# Patient Record
Sex: FEMALE | Race: WHITE | Employment: UNEMPLOYED | URBAN - METROPOLITAN AREA
[De-identification: names, ages, dates, MRNs, and addresses within clinical notes are randomized per-mention and may not be internally consistent; named-entity substitution may affect disease eponyms.]

---

## 2020-02-18 ENCOUNTER — OFFICE VISIT (OUTPATIENT)
Dept: PULMONOLOGY | Age: 40
End: 2020-02-18

## 2020-02-18 VITALS
OXYGEN SATURATION: 100 % | HEART RATE: 62 BPM | DIASTOLIC BLOOD PRESSURE: 70 MMHG | SYSTOLIC BLOOD PRESSURE: 110 MMHG | HEIGHT: 66 IN | WEIGHT: 176 LBS | BODY MASS INDEX: 28.28 KG/M2 | RESPIRATION RATE: 20 BRPM | TEMPERATURE: 98 F

## 2020-02-18 DIAGNOSIS — J45.40 MODERATE PERSISTENT ASTHMA, UNSPECIFIED WHETHER COMPLICATED: Primary | ICD-10-CM

## 2020-02-18 DIAGNOSIS — J30.1 ALLERGIC RHINITIS DUE TO POLLEN, UNSPECIFIED SEASONALITY: ICD-10-CM

## 2020-02-18 RX ORDER — IPRATROPIUM BROMIDE AND ALBUTEROL SULFATE 2.5; .5 MG/3ML; MG/3ML
SOLUTION RESPIRATORY (INHALATION)
COMMUNITY
Start: 2020-02-08 | End: 2020-03-17 | Stop reason: SDUPTHER

## 2020-02-18 RX ORDER — MONTELUKAST SODIUM 10 MG/1
TABLET ORAL
COMMUNITY
Start: 2020-02-08 | End: 2020-03-17 | Stop reason: SDUPTHER

## 2020-02-18 RX ORDER — FLUTICASONE PROPIONATE 50 MCG
SPRAY, SUSPENSION (ML) NASAL
COMMUNITY
Start: 2020-02-08

## 2020-02-18 NOTE — PROGRESS NOTES
Verbal Order with read back per Trey Zee MD  For PFT smart panel. AMB POC PFT complete w/ bronchodilator  AMB POC PFT complete w/o bronchodilator    Dr. Donaldo Chambers MD will co-sign the orders.

## 2020-02-18 NOTE — PROGRESS NOTES
Amador Adjutant presents today for   Chief Complaint   Patient presents with    Asthma       Is someone accompanying this pt? Gosia Kwok     Is the patient using any DME equipment during OV? No     -DME Company n/a     Depression Screening:  3 most recent PHQ Screens 3/28/2014   Little interest or pleasure in doing things Nearly every day   Feeling down, depressed, irritable, or hopeless More than half the days   Total Score PHQ 2 5   Trouble falling or staying asleep, or sleeping too much Several days   Feeling tired or having little energy Nearly every day   Poor appetite, weight loss, or overeating Several days   Feeling bad about yourself - or that you are a failure or have let yourself or your family down Several days   Trouble concentrating on things such as school, work, reading, or watching TV Several days   Moving or speaking so slowly that other people could have noticed; or the opposite being so fidgety that others notice Not at all   Thoughts of being better off dead, or hurting yourself in some way Not at all   How difficult have these problems made it for you to do your work, take care of your home and get along with others Somewhat difficult       Learning Assessment:  Learning Assessment 3/28/2014   PRIMARY LEARNER Patient   HIGHEST LEVEL OF EDUCATION - PRIMARY LEARNER  GRADUATED HIGH SCHOOL OR GED   BARRIERS PRIMARY LEARNER NONE   PRIMARY LANGUAGE ENGLISH   LEARNER PREFERENCE PRIMARY OTHER (COMMENT)   ANSWERED BY patient   RELATIONSHIP SELF       Abuse Screening:  Abuse Screening Questionnaire 8/11/2014   Do you ever feel afraid of your partner? N   Are you in a relationship with someone who physically or mentally threatens you? N   Is it safe for you to go home? Y       Fall Risk  Fall Risk Assessment, last 12 mths 8/11/2014   Able to walk? Yes   Fall in past 12 months? Yes   Fall with injury? No   Number of falls in past 12 months 8 or more   Fall Risk Score 8         Coordination of Care:  1. Have you been to the ER, urgent care clinic since your last visit? Hospitalized since your last visit? No    2. Have you seen or consulted any other health care providers outside of the [address] [place] [address] since your last visit? Include any pap smears or colon screening.  No

## 2020-03-16 ENCOUNTER — TELEPHONE (OUTPATIENT)
Dept: PULMONOLOGY | Age: 40
End: 2020-03-16

## 2020-03-16 DIAGNOSIS — J45.909 ASTHMA: ICD-10-CM

## 2020-03-16 DIAGNOSIS — Z91.09 ENVIRONMENTAL ALLERGIES: ICD-10-CM

## 2020-03-16 DIAGNOSIS — J45.40 MODERATE PERSISTENT ASTHMA, UNSPECIFIED WHETHER COMPLICATED: Primary | ICD-10-CM

## 2020-03-16 NOTE — TELEPHONE ENCOUNTER
Per pt, she started with a cold, but it is now in her chest.  She is on [facility], but thinks she needs a rescue inhaler and/or albuterol sol for her nebulizer. Please call [phone].

## 2020-03-17 RX ORDER — MONTELUKAST SODIUM 10 MG/1
TABLET ORAL
Qty: 30 TAB | Refills: 3 | Status: SHIPPED | OUTPATIENT
Start: 2020-03-17

## 2020-03-17 RX ORDER — IPRATROPIUM BROMIDE AND ALBUTEROL SULFATE 2.5; .5 MG/3ML; MG/3ML
3 SOLUTION RESPIRATORY (INHALATION)
Qty: 30 NEBULE | Refills: 3 | Status: SHIPPED | OUTPATIENT
Start: 2020-03-17

## 2020-03-17 RX ORDER — ALBUTEROL SULFATE 90 UG/1
2 AEROSOL, METERED RESPIRATORY (INHALATION)
Qty: 1 INHALER | Refills: 2 | Status: SHIPPED | OUTPATIENT
Start: 2020-03-17 | End: 2020-06-01 | Stop reason: SDUPTHER

## 2020-03-17 NOTE — TELEPHONE ENCOUNTER
Pt requesting refill on Albuterol inhaler, nebulizer DuoNeb and Singular.  She had meds when she came to see Dr. Worthy Less

## 2020-06-01 ENCOUNTER — VIRTUAL VISIT (OUTPATIENT)
Dept: PULMONOLOGY | Age: 40
End: 2020-06-01

## 2020-06-01 DIAGNOSIS — J30.9 ALLERGIC RHINITIS, UNSPECIFIED SEASONALITY, UNSPECIFIED TRIGGER: Primary | ICD-10-CM

## 2020-06-01 DIAGNOSIS — J45.41 MODERATE PERSISTENT ASTHMA WITH ACUTE EXACERBATION: ICD-10-CM

## 2020-06-01 RX ORDER — ALBUTEROL SULFATE 90 UG/1
2 AEROSOL, METERED RESPIRATORY (INHALATION)
Qty: 1 INHALER | Refills: 5 | Status: SHIPPED | OUTPATIENT
Start: 2020-06-01

## 2020-06-01 RX ORDER — PREDNISONE 10 MG/1
TABLET ORAL
Qty: 18 TAB | Refills: 0 | Status: SHIPPED | OUTPATIENT
Start: 2020-06-01 | End: 2022-09-27

## 2020-06-01 RX ORDER — MOMETASONE FUROATE AND FORMOTEROL FUMARATE DIHYDRATE 200; 5 UG/1; UG/1
2 AEROSOL RESPIRATORY (INHALATION) 2 TIMES DAILY
Qty: 1 INHALER | Refills: 5 | Status: SHIPPED | OUTPATIENT
Start: 2020-06-01 | End: 2022-09-27

## 2020-06-01 NOTE — PROGRESS NOTES
Guido Buenrostro is a 45 y.o. female who was seen by synchronous (real-time) audio-video technology on 6/1/2020. Consent: Guido Buenrostro, who was seen by synchronous (real-time) audio-video technology, and/or her healthcare decision maker, is aware that this patient-initiated, Telehealth encounter on 6/1/2020 is a billable service, with coverage as determined by her insurance carrier. She is aware that she may receive a bill and has provided verbal consent to proceed: Yes. Assessment & Plan:   Diagnoses and all orders for this visit:    1. Allergic rhinitis, unspecified seasonality, unspecified trigger    2. Moderate persistent asthma with acute exacerbation  -     albuterol (PROVENTIL HFA, VENTOLIN HFA, PROAIR HFA) 90 mcg/actuation inhaler; Take 2 Puffs by inhalation every six (6) hours as needed for Wheezing or Shortness of Breath.  -     IMMUNOGLOBULIN E, QT; Future  -     CBC WITH AUTOMATED DIFF; Future    Other orders  -     mometasone-formoterol (Dulera) 200-5 mcg/actuation HFA inhaler; Take 2 Puffs by inhalation two (2) times a day. -     predniSONE (DELTASONE) 10 mg tablet; 30 mg po daily x 3 days 20 mg po daily x 3 days 10 mg po daily x3 days    pt to start Prednisone taper, see orders. Continue Dulera and rescue Albuterol, refills sent. IgE and Eosinophils ordered to assess indications for Xolair or biologics. Trigger avoidance advised. Also advised use of buffered saline nasal flushes as Flonase causes stinging. I spent at least 25 minutes on this visit with this established patient. Subjective:   Guido Buenrostro is a 45 y.o. female who was seen for Cough with sputum  pt with a history of moderate persistent Asthma who was placed on Dulera with excellent initial response. Pt has noted worsening shortness of breath with wheezing, nasal congestion and rhinorrhea with cough occasionally productive of clear phlegm. She denies fever, chills or hemoptysis.  Symptoms are similar to prior complaints associated with Asthma exacerbation and often triggered by allergies. Pt denies chest pain. Pt has been using Albuterol up to 6 times daily. She stopped using Flonase daily due to stinging of nasal mucosa. Prior to Admission medications    Medication Sig Start Date End Date Taking? Authorizing Provider   albuterol (PROVENTIL HFA, VENTOLIN HFA, PROAIR HFA) 90 mcg/actuation inhaler Take 2 Puffs by inhalation every six (6) hours as needed for Wheezing or Shortness of Breath. 6/1/20  Yes Taylor Abdul MD   mometasone-formoterol Osorio Borges) 200-5 mcg/actuation HFA inhaler Take 2 Puffs by inhalation two (2) times a day. 6/1/20  Yes Taylor Abdul MD   predniSONE (DELTASONE) 10 mg tablet 30 mg po daily x 3 days 20 mg po daily x 3 days 10 mg po daily x3 days 6/1/20  Yes Taylor Abdul MD   albuterol-ipratropium (DUO-NEB) 2.5 mg-0.5 mg/3 ml nebu 3 mL by Nebulization route every six (6) hours as needed for Wheezing or Shortness of Breath. 3/17/20  Yes Elma TELLEZ NP   montelukast (SINGULAIR) 10 mg tablet 1 Tablet daily 3/17/20  Yes Elma TELLEZ NP   fluticasone propionate (FLONASE) 50 mcg/actuation nasal spray INSTILL TWO SQUIRTS IN THE NOSTRILS D UTD 2/8/20  Yes Provider, Historical   dextroamphetamine-amphetamine (ADDERALL) 10 mg tablet Take 10 mg by mouth. Yes Provider, Historical   b complex vitamins (B COMPLEX 1) tablet Take 1 Tab by mouth daily. Yes Provider, Historical   acetaminophen (TYLENOL EXTRA STRENGTH) 500 mg tablet Take 2 Tabs by mouth every eight (8) hours as needed for Pain. 3/28/14  Yes MD carmen Altmanetasone-formoterol [facility]) 200-5 mcg/actuation HFA inhaler Take 2 Puffs by inhalation two (2) times a day.  2/18/20   Taylor Abdul MD   tiZANidine (ZANAFLEX) 4 mg tablet 1-2  qhs  prn 3/2/15   Scar Longoria MD   baclofen (LIORESAL) 10 mg tablet Take 1/2 to one tab po qhs prn muscle spasms 11/6/14   Scar Longoria MD   ondansetron hcl (ZOFRAN, AS HYDROCHLORIDE,) 4 mg tablet Take 4 mg by mouth every eight (8) hours as needed for Nausea. Provider, Historical   phenazopyridine (PYRIDIUM) 100 mg tablet Take  by mouth three (3) times daily (after meals). Provider, Historical   ondansetron (ZOFRAN ODT) 8 mg disintegrating tablet Take 1 tablet by mouth every twelve (12) hours as needed for Nausea. 10/2/14   Osmany Sanchez MD   methocarbamol (ROBAXIN) 750 mg tablet Take 1 Tab by mouth three (3) times daily as needed (muscle spasm, low back pain). 7/2/14   Osmany Sanchez MD   buPROPion [facility]) 100 mg tablet Take 1 Tab by mouth two (2) times a day. 6/16/14   Osmany Sanchez MD   ALPRAZolam Reginald Bettina) 1 mg tablet Take 1 Tab by mouth three (3) times daily as needed for Anxiety. 6/16/14   Osmany Sanchez MD   meloxicam (MOBIC) 15 mg tablet Take 1 Tab by mouth daily as needed for Pain (Take with food).  3/28/14   Osmany Sanchez MD     Allergies   Allergen Reactions    Fentanyl Hives    Pollen Extracts Itching       Past Medical History:   Diagnosis Date    Asthma     No medications, doesn't have inhaler at home; flared by spring allergies    Chronic back pain 9/20/2013    3/28/2014:  Chronic back pain, disk herniation 11 years ago, back surgery 5 years ago (doesn't want to have another back surgery) -- MRI at [facility] recently -- No relief with back surgery, ESIs used to help, but then became ineffective     Depression     Headache     Joint pain     Kidney infection     Muscle pain     Numbness and tingling     Pseudotumor cerebri     After first pregnancy, seemed to resolve but occasionally gets flares of HAs, sleep helps     Past Surgical History:   Procedure Laterality Date    CHEST SURGERY PROCEDURE UNLISTED      HX GYN      Partial hysterectomy     Family History   Problem Relation Age of Onset    Migraines Mother     Arthritis-osteo Mother     Asthma Mother     Migraines Father     Cancer Father         prostate, colon, testicular, liver, skin    Elevated Lipids Father    [place] Migraines Sister     Asthma Sister     Migraines Maternal Grandmother      Social History     Tobacco Use    Smoking status: Former Smoker     Packs/day: 0.50     Years: 10.00     Pack years: 5.00     Types: Cigarettes     Last attempt to quit: 2015     Years since quittin.4    Smokeless tobacco: Never Used    Tobacco comment: vaping stopped    Substance Use Topics    Alcohol use: Yes     Comment: 2/mo       ROS    Objective:   Vital Signs: (As obtained by patient/caregiver at home)  There were no vitals taken for this visit.      [INSTRUCTIONS:  \"[x]\" Indicates a positive item  \"[]\" Indicates a negative item  -- DELETE ALL ITEMS NOT EXAMINED]    Constitutional: [x] Appears well-developed and well-nourished [x] No apparent distress      [] Abnormal -     Mental status: [x] Alert and awake  [x] Oriented to person/place/time [x] Able to follow commands    [] Abnormal -     Eyes:   EOM    [x]  Normal    [] Abnormal -   Sclera  [x]  Normal    [] Abnormal -          Discharge [x]  None visible   [] Abnormal -     HENT: [x] Normocephalic, atraumatic  [] Abnormal -   [x] Mouth/Throat: Mucous membranes are moist    External Ears [x] Normal  [] Abnormal -    Neck: [x] No visualized mass [] Abnormal -     Pulmonary/Chest: [x] Respiratory effort normal   [x] No visualized signs of difficulty breathing or respiratory distress        [] Abnormal -      Musculoskeletal:   [x] Normal gait with no signs of ataxia         [x] Normal range of motion of neck        [] Abnormal -     Neurological:        [x] No Facial Asymmetry (Cranial nerve 7 motor function) (limited exam due to video visit)          [x] No gaze palsy        [] Abnormal -          Skin:        [x] No significant exanthematous lesions or discoloration noted on facial skin         [] Abnormal -            Psychiatric:       [x] Normal Affect [] Abnormal -        [x] No Hallucinations    Other pertinent observable physical exam findings:-        We discussed the expected course, resolution and complications of the diagnosis(es) in detail. Medication risks, benefits, costs, interactions, and alternatives were discussed as indicated. I advised her to contact the office if her condition worsens, changes or fails to improve as anticipated. She expressed understanding with the diagnosis(es) and plan. Augusta Tsai is a 45 y.o. female who was evaluated by a video visit encounter for concerns as above. Patient identification was verified prior to start of the visit. A caregiver was present when appropriate. Due to this being a TeleHealth encounter (During [facility] public [facility] emergency), evaluation of the following organ systems was limited: Vitals/Constitutional/EENT/Resp/CV/GI//MS/Neuro/Skin/Heme-Lymph-Imm. Pursuant to the emergency declaration under the [facility], 1135 waiver authority and the [employer] Act, this Virtual  Visit was conducted, with patient's (and/or legal guardian's) consent, to reduce the patient's risk of exposure to COVID-19 and provide necessary medical care. Services were provided through a video synchronous discussion virtually to substitute for in-person clinic visit. Patient and provider were located at their individual homes.       Mira Stevens MD

## 2021-11-26 ENCOUNTER — DOCUMENTATION ONLY (OUTPATIENT)
Dept: PULMONOLOGY | Age: 41
End: 2021-11-26

## 2021-11-26 NOTE — PROGRESS NOTES
Left message for pt to set up follow up appointment w/[facility]Z. Pt last seen 6/1/2020 by virtual visit. Ref in filing cabinet from  [employer].

## 2022-09-26 ENCOUNTER — OFFICE VISIT (OUTPATIENT)
Dept: SURGERY | Age: 42
End: 2022-09-26
Payer: COMMERCIAL

## 2022-09-26 VITALS
WEIGHT: 189 LBS | OXYGEN SATURATION: 99 % | BODY MASS INDEX: 31.49 KG/M2 | HEIGHT: 65 IN | SYSTOLIC BLOOD PRESSURE: 126 MMHG | HEART RATE: 79 BPM | DIASTOLIC BLOOD PRESSURE: 79 MMHG | TEMPERATURE: 98 F

## 2022-09-26 DIAGNOSIS — D17.24 LIPOMA OF LEFT LOWER EXTREMITY: Primary | ICD-10-CM

## 2022-09-26 PROCEDURE — 99204 OFFICE O/P NEW MOD 45 MIN: CPT | Performed by: SURGERY

## 2022-09-26 RX ORDER — VENLAFAXINE HYDROCHLORIDE 75 MG/1
37.5 CAPSULE, EXTENDED RELEASE ORAL 2 TIMES DAILY
COMMUNITY

## 2022-09-26 NOTE — H&P (VIEW-ONLY)
CC:   Chief Complaint   Patient presents with    New Patient     Cysts on legs         Assessment:    ICD-10-CM ICD-9-CM    1. Lipoma of left lower extremity  D17.24 214.1 SCHEDULE SURGERY          Plan: She is interested in removal of the left thigh masses which I believe are benign lipomas but due to increase size and pain she would like removed. I recommended removal under MAC and local. The risks and benefits of the procedure were reviewed with the patient including infection, bleeding, need for repeat procedure, injury to surrounding structures and poor cosmetic outcome. Questions were answered and consent was obtained. HPI:  Amber Reinoso is a 40 y.o. female who is referred for painful lumps on her legs. She states they have been there for many years and in the past she was told nothing needed to be done if they did not bother her. Overall she is feeling healthy with no other complaints. She states there are 3 lumps on her left thigh that have increased in size and do cause pain in particular when she sits down and pressure is applied to them. She does have another small bump on her right forearm and right thigh but these do not seem to cause any pain. She reports no redness or drainage from her skin. Allergies: Allergies   Allergen Reactions    Fentanyl Hives    Pollen Extracts Itching       Medication Review:  Current Outpatient Medications on File Prior to Visit   Medication Sig Dispense Refill    venlafaxine-SR (Effexor XR) 75 mg capsule Take  by mouth daily. albuterol (PROVENTIL HFA, VENTOLIN HFA, PROAIR HFA) 90 mcg/actuation inhaler Take 2 Puffs by inhalation every six (6) hours as needed for Wheezing or Shortness of Breath. 1 Inhaler 5    albuterol-ipratropium (DUO-NEB) 2.5 mg-0.5 mg/3 ml nebu 3 mL by Nebulization route every six (6) hours as needed for Wheezing or Shortness of Breath.  30 Nebule 3    montelukast (SINGULAIR) 10 mg tablet 1 Tablet daily 30 Tab 3    fluticasone propionate (FLONASE) 50 mcg/actuation nasal spray INSTILL TWO SQUIRTS IN THE NOSTRILS D UTD      dextroamphetamine-amphetamine (ADDERALL) 10 mg tablet Take 10 mg by mouth.      b complex vitamins tablet Take 1 Tab by mouth daily. buPROPion (WELLBUTRIN) 100 mg tablet Take 1 Tab by mouth two (2) times a day. 60 Tab 1    acetaminophen (TYLENOL EXTRA STRENGTH) 500 mg tablet Take 2 Tabs by mouth every eight (8) hours as needed for Pain. 99 Tab 11    mometasone-formoterol (Dulera) 200-5 mcg/actuation HFA inhaler Take 2 Puffs by inhalation two (2) times a day. (Patient not taking: Reported on 9/26/2022) 1 Inhaler 5    predniSONE (DELTASONE) 10 mg tablet 30 mg po daily x 3 days 20 mg po daily x 3 days 10 mg po daily x3 days (Patient not taking: Reported on 9/26/2022) 18 Tab 0    mometasone-formoterol (DULERA) 200-5 mcg/actuation HFA inhaler Take 2 Puffs by inhalation two (2) times a day. (Patient not taking: Reported on 9/26/2022) 1 Inhaler 0    tiZANidine (ZANAFLEX) 4 mg tablet 1-2  qhs  prn (Patient not taking: Reported on 9/26/2022) 60 Tab 1    baclofen (LIORESAL) 10 mg tablet Take 1/2 to one tab po qhs prn muscle spasms (Patient not taking: Reported on 9/26/2022) 30 tablet 1    ondansetron hcl (ZOFRAN) 4 mg tablet Take 4 mg by mouth every eight (8) hours as needed for Nausea. (Patient not taking: Reported on 9/26/2022)      phenazopyridine (PYRIDIUM) 100 mg tablet Take  by mouth three (3) times daily (after meals). (Patient not taking: Reported on 9/26/2022)      ondansetron (ZOFRAN ODT) 8 mg disintegrating tablet Take 1 tablet by mouth every twelve (12) hours as needed for Nausea. (Patient not taking: Reported on 9/26/2022) 40 tablet 0    methocarbamol (ROBAXIN) 750 mg tablet Take 1 Tab by mouth three (3) times daily as needed (muscle spasm, low back pain). (Patient not taking: Reported on 9/26/2022) 90 Tab 0    ALPRAZolam (XANAX) 1 mg tablet Take 1 Tab by mouth three (3) times daily as needed for Anxiety.  (Patient not taking: Reported on 2022) 90 Tab 1    meloxicam (MOBIC) 15 mg tablet Take 1 Tab by mouth daily as needed for Pain (Take with food). (Patient not taking: Reported on 2022) 30 Tab 1     No current facility-administered medications on file prior to visit. Systems Review:  Review of Systems   Constitutional:  Negative for fatigue and fever. Respiratory:  Negative for chest tightness and shortness of breath. Cardiovascular:  Negative for chest pain and palpitations. Skin:  Negative for pallor, rash and wound. Hematological:  Negative for adenopathy. Does not bruise/bleed easily.      PMH:  Past Medical History:   Diagnosis Date    Asthma     No medications, doesn't have inhaler at home; flared by spring allergies    Chronic back pain 2013    3/28/2014:  Chronic back pain, disk herniation 11 years ago, back surgery 5 years ago (doesn't want to have another back surgery) -- MRI at [facility] recently -- No relief with back surgery, ESIs used to help, but then became ineffective     Depression     Headache     Joint pain     Kidney infection     Muscle pain     Numbness and tingling     Pseudotumor cerebri     After first pregnancy, seemed to resolve but occasionally gets flares of HAs, sleep helps       Surgical History:  Past Surgical History:   Procedure Laterality Date    HX GYN      Partial hysterectomy    [place] CHEST SURGERY PROCEDURE UNLISTED         Social History:  Social History     Socioeconomic History    Marital status:    Tobacco Use    Smoking status: Former     Packs/day: 0.50     Years: 10.00     Pack years: 5.00     Types: Cigarettes     Quit date:      Years since quittin.7    Smokeless tobacco: Never    Tobacco comments:     vaping stopped    Substance and Sexual Activity    Alcohol use: Yes     Comment: 2/mo    Drug use: No    Sexual activity: Yes     Partners: Male     Birth control/protection: Surgical       Family History:  Family History   Problem Relation Age of Onset    Migraines Mother     OSTEOARTHRITIS Mother     Asthma Mother     Migraines Father     Cancer Father         prostate, colon, testicular, liver, skin    Elevated Lipids Father     Migraines Sister     Asthma Sister     Migraines Maternal Grandmother        No visits with results within 3 Month(s) from this visit. Latest known visit with results is:   Office Visit on 03/02/2015   Component Date Value Ref Range Status    AMPHETAMINES UR POC 03/02/2015 Negative   Final    COCAINE UR POC 03/02/2015 Negative   Final    MDMA/ECSTASY UR POC 03/02/2015 Negative   Final    METHADONE UR POC 03/02/2015 Negative   Final    METHAMPHETAMINE UR POC 03/02/2015 Negative   Final    METHYLPHENIDATE UR POC 03/02/2015 Negative   Final    OPIATES UR POC 03/02/2015 Negative   Final    OXYCODONE UR POC 03/02/2015 Negative   Final    PHENCYCLIDINE UR POC 03/02/2015 Negative   Final    TRICYCLICS UR POC 71/55/6620 Presumptive Positive   Final    BARBITURATES UR POC 03/02/2015 Negative   Final    BENZODIAZEPINES UR POC 03/02/2015 Presumptive Positive   Final    CANNABINOIDS UR POC 03/02/2015 Negative   Final       No image results found. Physical Exam:  Visit Vitals  /79 (BP 1 Location: Right arm, BP Patient Position: Sitting)   Pulse 79   Temp 98 °F (36.7 °C) (Temporal)   Ht 5' 5\" (1.651 m)   Wt 85.7 kg (189 lb)   SpO2 99%   BMI 31.45 kg/m²    BMI: Body mass index is 31.45 kg/m². Physical Exam  Constitutional:       Appearance: Normal appearance. She is normal weight. HENT:      Head: Normocephalic and atraumatic. Eyes:      Extraocular Movements: Extraocular movements intact. Conjunctiva/sclera: Conjunctivae normal.      Pupils: Pupils are equal, round, and reactive to light. Cardiovascular:      Rate and Rhythm: Normal rate. Pulmonary:      Effort: Pulmonary effort is normal.   Skin:     General: Skin is warm and dry.       Comments: Left anterior thigh mass approximately 2.0cm on exam, left upper outer thigh mass approximately 4.0cm left upper posterior thigh mass approximately 3.0cm on exam, tender to touch with no overlying skin changes   Neurological:      General: No focal deficit present. Mental Status: She is alert and oriented to person, place, and time. Mental status is at baseline. Psychiatric:         Mood and Affect: Mood normal.         Behavior: Behavior normal.         Thought Content: Thought content normal.         Judgment: Judgment normal.       I have reviewed the information entered by the clinical staff and/or patient and verified it as accurate or edited where necessary.      Electronically signed by:    Erik Bassett DO, MPH

## 2022-09-26 NOTE — PROGRESS NOTES
CC:   Chief Complaint   Patient presents with    New Patient     Cysts on legs         Assessment:    ICD-10-CM ICD-9-CM    1. Lipoma of left lower extremity  D17.24 214.1 SCHEDULE SURGERY          Plan: She is interested in removal of the left thigh masses which I believe are benign lipomas but due to increase size and pain she would like removed. I recommended removal under MAC and local. The risks and benefits of the procedure were reviewed with the patient including infection, bleeding, need for repeat procedure, injury to surrounding structures and poor cosmetic outcome. Questions were answered and consent was obtained. HPI:  Awais Brooks is a 40 y.o. female who is referred for painful lumps on her legs. She states they have been there for many years and in the past she was told nothing needed to be done if they did not bother her. Overall she is feeling healthy with no other complaints. She states there are 3 lumps on her left thigh that have increased in size and do cause pain in particular when she sits down and pressure is applied to them. She does have another small bump on her right forearm and right thigh but these do not seem to cause any pain. She reports no redness or drainage from her skin. Allergies: Allergies   Allergen Reactions    Fentanyl Hives    Pollen Extracts Itching       Medication Review:  Current Outpatient Medications on File Prior to Visit   Medication Sig Dispense Refill    venlafaxine-SR (Effexor XR) 75 mg capsule Take  by mouth daily. albuterol (PROVENTIL HFA, VENTOLIN HFA, PROAIR HFA) 90 mcg/actuation inhaler Take 2 Puffs by inhalation every six (6) hours as needed for Wheezing or Shortness of Breath. 1 Inhaler 5    albuterol-ipratropium (DUO-NEB) 2.5 mg-0.5 mg/3 ml nebu 3 mL by Nebulization route every six (6) hours as needed for Wheezing or Shortness of Breath.  30 Nebule 3    montelukast (SINGULAIR) 10 mg tablet 1 Tablet daily 30 Tab 3    fluticasone propionate (FLONASE) 50 mcg/actuation nasal spray INSTILL TWO SQUIRTS IN THE NOSTRILS D UTD      dextroamphetamine-amphetamine (ADDERALL) 10 mg tablet Take 10 mg by mouth.      b complex vitamins tablet Take 1 Tab by mouth daily. buPROPion (WELLBUTRIN) 100 mg tablet Take 1 Tab by mouth two (2) times a day. 60 Tab 1    acetaminophen (TYLENOL EXTRA STRENGTH) 500 mg tablet Take 2 Tabs by mouth every eight (8) hours as needed for Pain. 99 Tab 11    mometasone-formoterol (Dulera) 200-5 mcg/actuation HFA inhaler Take 2 Puffs by inhalation two (2) times a day. (Patient not taking: Reported on 9/26/2022) 1 Inhaler 5    predniSONE (DELTASONE) 10 mg tablet 30 mg po daily x 3 days 20 mg po daily x 3 days 10 mg po daily x3 days (Patient not taking: Reported on 9/26/2022) 18 Tab 0    mometasone-formoterol (DULERA) 200-5 mcg/actuation HFA inhaler Take 2 Puffs by inhalation two (2) times a day. (Patient not taking: Reported on 9/26/2022) 1 Inhaler 0    tiZANidine (ZANAFLEX) 4 mg tablet 1-2  qhs  prn (Patient not taking: Reported on 9/26/2022) 60 Tab 1    baclofen (LIORESAL) 10 mg tablet Take 1/2 to one tab po qhs prn muscle spasms (Patient not taking: Reported on 9/26/2022) 30 tablet 1    ondansetron hcl (ZOFRAN) 4 mg tablet Take 4 mg by mouth every eight (8) hours as needed for Nausea. (Patient not taking: Reported on 9/26/2022)      phenazopyridine (PYRIDIUM) 100 mg tablet Take  by mouth three (3) times daily (after meals). (Patient not taking: Reported on 9/26/2022)      ondansetron (ZOFRAN ODT) 8 mg disintegrating tablet Take 1 tablet by mouth every twelve (12) hours as needed for Nausea. (Patient not taking: Reported on 9/26/2022) 40 tablet 0    methocarbamol (ROBAXIN) 750 mg tablet Take 1 Tab by mouth three (3) times daily as needed (muscle spasm, low back pain). (Patient not taking: Reported on 9/26/2022) 90 Tab 0    ALPRAZolam (XANAX) 1 mg tablet Take 1 Tab by mouth three (3) times daily as needed for Anxiety.  (Patient not taking: Reported on 2022) 90 Tab 1    meloxicam (MOBIC) 15 mg tablet Take 1 Tab by mouth daily as needed for Pain (Take with food). (Patient not taking: Reported on 2022) 30 Tab 1     No current facility-administered medications on file prior to visit. Systems Review:  Review of Systems   Constitutional:  Negative for fatigue and fever. Respiratory:  Negative for chest tightness and shortness of breath. Cardiovascular:  Negative for chest pain and palpitations. Skin:  Negative for pallor, rash and wound. Hematological:  Negative for adenopathy. Does not bruise/bleed easily.      PMH:  Past Medical History:   Diagnosis Date    Asthma     No medications, doesn't have inhaler at home; flared by spring allergies    Chronic back pain 2013    3/28/2014:  Chronic back pain, disk herniation 11 years ago, back surgery 5 years ago (doesn't want to have another back surgery) -- MRI at [facility] -- No relief with back surgery, ESIs used to help, but then became ineffective     Depression     Headache     Joint pain     Kidney infection     Muscle pain     Numbness and tingling     Pseudotumor cerebri     After first pregnancy, seemed to resolve but occasionally gets flares of HAs, sleep helps       Surgical History:  Past Surgical History:   Procedure Laterality Date    HX GYN      Partial hysterectomy    CT CHEST SURGERY PROCEDURE UNLISTED         Social History:  Social History     Socioeconomic History    Marital status:    Tobacco Use    Smoking status: Former     Packs/day: 0.50     Years: 10.00     Pack years: 5.00     Types: Cigarettes     Quit date:      Years since quittin.7    Smokeless tobacco: Never    Tobacco comments:     vaping stopped    Substance and Sexual Activity    Alcohol use: Yes     Comment: 2/mo    Drug use: No    Sexual activity: Yes     Partners: Male     Birth control/protection: Surgical       Family History:  Family History   Problem Relation Age of Onset    Migraines Mother     OSTEOARTHRITIS Mother     Asthma Mother     Migraines Father     Cancer Father         prostate, colon, testicular, liver, skin    Elevated Lipids Father     Migraines Sister     Asthma Sister     Migraines Maternal Grandmother        No visits with results within 3 Month(s) from this visit. Latest known visit with results is:   Office Visit on 03/02/2015   Component Date Value Ref Range Status    AMPHETAMINES UR POC 03/02/2015 Negative   Final    COCAINE UR POC 03/02/2015 Negative   Final    MDMA/ECSTASY UR POC 03/02/2015 Negative   Final    METHADONE UR POC 03/02/2015 Negative   Final    METHAMPHETAMINE UR POC 03/02/2015 Negative   Final    METHYLPHENIDATE UR POC 03/02/2015 Negative   Final    OPIATES UR POC 03/02/2015 Negative   Final    OXYCODONE UR POC 03/02/2015 Negative   Final    PHENCYCLIDINE UR POC 03/02/2015 Negative   Final    TRICYCLICS UR POC 42/72/1618 Presumptive Positive   Final    BARBITURATES UR POC 03/02/2015 Negative   Final    BENZODIAZEPINES UR POC 03/02/2015 Presumptive Positive   Final    CANNABINOIDS UR POC 03/02/2015 Negative   Final       No image results found. Physical Exam:  Visit Vitals  /79 (BP 1 Location: Right arm, BP Patient Position: Sitting)   Pulse 79   Temp 98 °F (36.7 °C) (Temporal)   Ht 5' 5\" (1.651 m)   Wt 85.7 kg (189 lb)   SpO2 99%   BMI 31.45 kg/m²    BMI: Body mass index is 31.45 kg/m². Physical Exam  Constitutional:       Appearance: Normal appearance. She is normal weight. HENT:      Head: Normocephalic and atraumatic. Eyes:      Extraocular Movements: Extraocular movements intact. Conjunctiva/sclera: Conjunctivae normal.      Pupils: Pupils are equal, round, and reactive to light. Cardiovascular:      Rate and Rhythm: Normal rate. Pulmonary:      Effort: Pulmonary effort is normal.   Skin:     General: Skin is warm and dry.       Comments: Left anterior thigh mass approximately 2.0cm on exam, left upper outer thigh mass approximately 4.0cm left upper posterior thigh mass approximately 3.0cm on exam, tender to touch with no overlying skin changes   Neurological:      General: No focal deficit present. Mental Status: She is alert and oriented to person, place, and time. Mental status is at baseline. Psychiatric:         Mood and Affect: Mood normal.         Behavior: Behavior normal.         Thought Content: Thought content normal.         Judgment: Judgment normal.       I have reviewed the information entered by the clinical staff and/or patient and verified it as accurate or edited where necessary.      Electronically signed by:    Salma PresDO chuy, MPH

## 2022-09-26 NOTE — PROGRESS NOTES
Avni Chi is a 40 y.o. female (: 1980) presenting to address:    Chief Complaint   Patient presents with    New Patient     Cysts on legs        Medication list and allergies have been reviewed with Avni Chi and updated as of today's date. I have gone over all Medical, Surgical and Social History with Avni Chi and updated/added the information accordingly.

## 2022-09-26 NOTE — LETTER
9/26/2022    Patient: Eileen Knowles   YOB: 1980   Date of Visit: 9/26/2022     Prema Franco MD  [address]  [facility]  [address] [facility] [address]  Via Fax: [phone]    Dear Prema Franco MD,      Thank you [facility]. My notes for this consultation are attached. If you have questions, please do not hesitate to call me. I look forward to following your patient along with you.       Sincerely,    Twin Daniel, [address]

## 2022-09-26 NOTE — PATIENT INSTRUCTIONS
If you have any questions or concerns about today's appointment, the verbal and/or written instructions you were given for follow up care, please call our office at [phone]. [facility] - [address], [address] [place] [address] Road    [phone] office  [phone] fax       PATIENT PRE AND POST OPERATIVE INSTRUCTIONS       [facility]   [address] [place], [address]  [phone]    Before Surgery Instructions:   1) You must have someone available to drive you to and from your procedure and stay with you for the first 24 hours. 2) It is very important that you have nothing to eat or drink after midnight the night before your surgery. This includes chewing gum or sucking on hard candy. Take only heart, blood pressure and cholesterol medications the morning of surgery with only a sip of water. 3) Please stop taking Plavix 5-7 days prior to your surgery with prescribing physician approval.  Stop taking Coumadin 5 days prior to your surgery with prescribing physician approval.  Stop taking all Aspirin or Aspirin containing products 7 days prior to your surgery. Stop taking Advil, Motrin, Aleve, and etc. 3 days prior to your surgery. 4) If you take any diabetic medications please consult with your primary care physician on how to take them on the day of your surgery  5) Please stop all Herbal products 2 weeks prior to your surgery. 6) Please arrive at the hospital 2 hours prior to your surgery, unless you have been otherwise instructed. 7) Patients having an operation on their colon will be given a separate instruction sheet on their Bowel Prep. 8) For any pre-operative work up check in at the main entrance to [facility], and then go to Patient Registration. These studies are done on a walk in basis they are open from 7:00am to 5:00pm Monday through Friday.   9) A urine drug screen will be performed on the day of surgery. Please be advised if your drug screen test result is positive for any illegal substances your surgery is subject to cancellation. 10) Please wash your surgical site the morning of your surgery with soap and water. 11) If you are of child bearing age you will have pregnancy test done the morning of your surgery as soon as you arrive. 12) You're surgery time is subject to change. At times this is necessary due to equipment or staffing needs. Please be advised it's your responsibility to notify our office of any changes to your healthcare coverage. Failure to notify our office of any changes to your health care coverage may result in denial of payment by your health insurance for all incurred services and you would be responsible for payment for all incurred services. After Surgery Instructions: You will need to be seen in the office for a follow-up visit 7-14 days after your surgery. Please call after you have had the procedure to make this appointment. Unless otherwise instructed, you may remove your outer bandage and shower 48 hours after your surgery. If you develop a fever greater than 101, have any significant drainage, bleeding, swelling and/or pus of the wound. Please call our office immediately. Surgery Date and Time:  Thursday, September 29, 2002 at 1:30pm    Please enter [facility] main entrance on the first floor and go to Patient Registration. Once registered, a member of our team will escort you to the second floor. Please check in by 11:00am the day of your surgery. You may contact Ezra Allred with any questions at [phone].

## 2022-09-27 NOTE — PERIOP NOTES
PRE-SURGICAL INSTRUCTIONS        Patient's Name:  Steffen Lazar      [facility] Date:  9/27/2022            Covid Testing Date and Time:    Surgery Date:  9/29/2022                Do NOT eat or drink anything, including candy, gum, or ice chips after midnight on 9/29, unless you have specific instructions from your surgeon or anesthesia provider to do so. You may brush your teeth before coming to the hospital.  No smoking 24 hours prior to the day of surgery. No alcohol 24 hours prior to the day of surgery. No recreational drugs for one week prior to the day of surgery. Leave all valuables, including money/purse, at home. Remove all jewelry, nail polish, acrylic nails, and makeup (including mascara); no lotions powders, deodorant, or perfume/cologne/after shave on the skin. Follow instruction for Hibiclens washes and CHG wipes from surgeon's office. Glasses/contact lenses and dentures may be worn to the hospital.  They will be removed prior to surgery. Call your doctor if symptoms of a cold or illness develop within 24-48 hours prior to your surgery. 11.  If you are having an outpatient procedure, please make arrangements for a responsible ADULT TO [address] [place] [address] and stay with you for 24 hours after your surgery. 12. ONE VISITOR in the hospital at this time for outpatient procedures. Exceptions may be made for surgical admissions, per nursing unit guidelines      Special Instructions:      Bring list of CURRENT medications. Bring inhaler. Bring any pertinent legal medical records. Take these medications the morning of surgery with a sip of water:  per office        On the day of surgery, come in the main entrance of [facility]. Let the  at the desk know you are there for surgery. A staff member will come escort you to the surgical area on the second floor.     If you have any questions or concerns, please do not hesitate to call:     (Prior to the day of surgery) [facility] department:  [phone]   (Day of surgery) Pre-Op department:  [phone]    These surgical instructions were reviewed with the patient during the [facility] phone call.

## 2022-09-28 ENCOUNTER — ANESTHESIA EVENT (OUTPATIENT)
Dept: SURGERY | Age: 42
End: 2022-09-28
Payer: COMMERCIAL

## 2022-09-29 ENCOUNTER — ANESTHESIA (OUTPATIENT)
Dept: SURGERY | Age: 42
End: 2022-09-29
Payer: COMMERCIAL

## 2022-09-29 ENCOUNTER — HOSPITAL ENCOUNTER (OUTPATIENT)
Age: 42
Setting detail: OUTPATIENT SURGERY
Discharge: HOME | End: 2022-09-29
Attending: SURGERY | Admitting: SURGERY
Payer: COMMERCIAL

## 2022-09-29 VITALS
HEIGHT: 65 IN | RESPIRATION RATE: 12 BRPM | TEMPERATURE: 97.6 F | OXYGEN SATURATION: 92 % | WEIGHT: 188 LBS | DIASTOLIC BLOOD PRESSURE: 61 MMHG | HEART RATE: 73 BPM | SYSTOLIC BLOOD PRESSURE: 111 MMHG | BODY MASS INDEX: 31.32 KG/M2

## 2022-09-29 DIAGNOSIS — D17.20 LIPOMA OF LOWER LEG: Primary | ICD-10-CM

## 2022-09-29 DIAGNOSIS — F32.A ANXIETY AND DEPRESSION: Chronic | ICD-10-CM

## 2022-09-29 DIAGNOSIS — F41.9 ANXIETY AND DEPRESSION: Chronic | ICD-10-CM

## 2022-09-29 PROCEDURE — 74011250636 HC RX REV CODE- 250/636: Performed by: NURSE ANESTHETIST, CERTIFIED REGISTERED

## 2022-09-29 PROCEDURE — 77030018836 HC SOL IRR NACL ICUM -A: Performed by: SURGERY

## 2022-09-29 PROCEDURE — 2709999900 HC NON-CHARGEABLE SUPPLY: Performed by: SURGERY

## 2022-09-29 PROCEDURE — 76210000021 HC REC RM PH II 0.5 TO 1 HR: Performed by: SURGERY

## 2022-09-29 PROCEDURE — 77030031139 HC SUT VCRL2 J&J -A: Performed by: SURGERY

## 2022-09-29 PROCEDURE — 74011250636 HC RX REV CODE- 250/636: Performed by: SURGERY

## 2022-09-29 PROCEDURE — 00400 ANES INTEGUMENTARY SYS NOS: CPT | Performed by: ANESTHESIOLOGY

## 2022-09-29 PROCEDURE — 74011000250 HC RX REV CODE- 250: Performed by: SURGERY

## 2022-09-29 PROCEDURE — 76210000006 HC OR PH I REC 0.5 TO 1 HR: Performed by: SURGERY

## 2022-09-29 PROCEDURE — 76060000032 HC ANESTHESIA 0.5 TO 1 HR: Performed by: SURGERY

## 2022-09-29 PROCEDURE — 77030002933 HC SUT MCRYL J&J -A: Performed by: SURGERY

## 2022-09-29 PROCEDURE — 76010000138 HC OR TIME 0.5 TO 1 HR: Performed by: SURGERY

## 2022-09-29 PROCEDURE — 88304 TISSUE EXAM BY PATHOLOGIST: CPT

## 2022-09-29 PROCEDURE — 00400 ANES INTEGUMENTARY SYS NOS: CPT | Performed by: NURSE ANESTHETIST, CERTIFIED REGISTERED

## 2022-09-29 PROCEDURE — 27618 EXC LEG/ANKLE TUM < 3 CM: CPT | Performed by: SURGERY

## 2022-09-29 PROCEDURE — 27632 EXC LEG/ANKLE LES SC 3 CM/>: CPT | Performed by: SURGERY

## 2022-09-29 RX ORDER — HYDROMORPHONE HYDROCHLORIDE 1 MG/ML
0.5 INJECTION, SOLUTION INTRAMUSCULAR; INTRAVENOUS; SUBCUTANEOUS AS NEEDED
Status: COMPLETED | OUTPATIENT
Start: 2022-09-29 | End: 2022-09-29

## 2022-09-29 RX ORDER — ONDANSETRON 2 MG/ML
INJECTION INTRAMUSCULAR; INTRAVENOUS AS NEEDED
Status: DISCONTINUED | OUTPATIENT
Start: 2022-09-29 | End: 2022-09-29 | Stop reason: HOSPADM

## 2022-09-29 RX ORDER — HYDROCODONE BITARTRATE AND ACETAMINOPHEN 5; 325 MG/1; MG/1
1 TABLET ORAL
Qty: 10 TABLET | Refills: 0 | Status: SHIPPED | OUTPATIENT
Start: 2022-09-29 | End: 2022-10-02

## 2022-09-29 RX ORDER — SODIUM CHLORIDE 0.9 % (FLUSH) 0.9 %
5-40 SYRINGE (ML) INJECTION AS NEEDED
Status: DISCONTINUED | OUTPATIENT
Start: 2022-09-29 | End: 2022-09-29 | Stop reason: HOSPADM

## 2022-09-29 RX ORDER — SODIUM CHLORIDE 0.9 % (FLUSH) 0.9 %
5-40 SYRINGE (ML) INJECTION EVERY 8 HOURS
Status: DISCONTINUED | OUTPATIENT
Start: 2022-09-29 | End: 2022-09-29 | Stop reason: HOSPADM

## 2022-09-29 RX ORDER — MIDAZOLAM HYDROCHLORIDE 1 MG/ML
INJECTION, SOLUTION INTRAMUSCULAR; INTRAVENOUS AS NEEDED
Status: DISCONTINUED | OUTPATIENT
Start: 2022-09-29 | End: 2022-09-29 | Stop reason: HOSPADM

## 2022-09-29 RX ORDER — SODIUM CHLORIDE, SODIUM LACTATE, POTASSIUM CHLORIDE, CALCIUM CHLORIDE 600; 310; 30; 20 MG/100ML; MG/100ML; MG/100ML; MG/100ML
25 INJECTION, SOLUTION INTRAVENOUS CONTINUOUS
Status: DISCONTINUED | OUTPATIENT
Start: 2022-09-29 | End: 2022-09-29 | Stop reason: HOSPADM

## 2022-09-29 RX ORDER — PROPOFOL 10 MG/ML
INJECTION, EMULSION INTRAVENOUS
Status: DISCONTINUED | OUTPATIENT
Start: 2022-09-29 | End: 2022-09-29 | Stop reason: HOSPADM

## 2022-09-29 RX ORDER — HYDROMORPHONE HYDROCHLORIDE 1 MG/ML
0.5 INJECTION, SOLUTION INTRAMUSCULAR; INTRAVENOUS; SUBCUTANEOUS
Status: DISCONTINUED | OUTPATIENT
Start: 2022-09-29 | End: 2022-09-29 | Stop reason: HOSPADM

## 2022-09-29 RX ORDER — OXYCODONE AND ACETAMINOPHEN 5; 325 MG/1; MG/1
1 TABLET ORAL AS NEEDED
Status: DISCONTINUED | OUTPATIENT
Start: 2022-09-29 | End: 2022-09-29 | Stop reason: HOSPADM

## 2022-09-29 RX ORDER — LIDOCAINE HYDROCHLORIDE 10 MG/ML
0.1 INJECTION, SOLUTION EPIDURAL; INFILTRATION; INTRACAUDAL; PERINEURAL AS NEEDED
Status: DISCONTINUED | OUTPATIENT
Start: 2022-09-29 | End: 2022-09-29 | Stop reason: HOSPADM

## 2022-09-29 RX ORDER — MAGNESIUM SULFATE 100 %
4 CRYSTALS MISCELLANEOUS AS NEEDED
Status: DISCONTINUED | OUTPATIENT
Start: 2022-09-29 | End: 2022-09-29 | Stop reason: HOSPADM

## 2022-09-29 RX ORDER — PROPOFOL 10 MG/ML
INJECTION, EMULSION INTRAVENOUS AS NEEDED
Status: DISCONTINUED | OUTPATIENT
Start: 2022-09-29 | End: 2022-09-29 | Stop reason: HOSPADM

## 2022-09-29 RX ORDER — HYDROMORPHONE HYDROCHLORIDE 2 MG/ML
INJECTION, SOLUTION INTRAMUSCULAR; INTRAVENOUS; SUBCUTANEOUS AS NEEDED
Status: DISCONTINUED | OUTPATIENT
Start: 2022-09-29 | End: 2022-09-29 | Stop reason: HOSPADM

## 2022-09-29 RX ORDER — DEXTROSE MONOHYDRATE 100 MG/ML
0-250 INJECTION, SOLUTION INTRAVENOUS AS NEEDED
Status: DISCONTINUED | OUTPATIENT
Start: 2022-09-29 | End: 2022-09-29 | Stop reason: HOSPADM

## 2022-09-29 RX ADMIN — SODIUM CHLORIDE, POTASSIUM CHLORIDE, SODIUM LACTATE AND CALCIUM CHLORIDE 25 ML/HR: 600; 310; 30; 20 INJECTION, SOLUTION INTRAVENOUS at 12:16

## 2022-09-29 RX ADMIN — PROPOFOL 40 MG: 10 INJECTION, EMULSION INTRAVENOUS at 13:46

## 2022-09-29 RX ADMIN — HYDROMORPHONE HYDROCHLORIDE 1 MG: 2 INJECTION INTRAMUSCULAR; INTRAVENOUS; SUBCUTANEOUS at 13:40

## 2022-09-29 RX ADMIN — PROPOFOL 100 MCG/KG/MIN: 10 INJECTION, EMULSION INTRAVENOUS at 13:47

## 2022-09-29 RX ADMIN — MIDAZOLAM HYDROCHLORIDE 2 MG: 2 INJECTION, SOLUTION INTRAMUSCULAR; INTRAVENOUS at 13:31

## 2022-09-29 RX ADMIN — CEFAZOLIN SODIUM 2 G: 1 INJECTION, POWDER, FOR SOLUTION INTRAMUSCULAR; INTRAVENOUS at 13:47

## 2022-09-29 RX ADMIN — ONDANSETRON 4 MG: 2 INJECTION INTRAMUSCULAR; INTRAVENOUS at 14:11

## 2022-09-29 RX ADMIN — HYDROMORPHONE HYDROCHLORIDE 0.5 MG: 1 INJECTION, SOLUTION INTRAMUSCULAR; INTRAVENOUS; SUBCUTANEOUS at 15:09

## 2022-09-29 NOTE — DISCHARGE INSTRUCTIONS
Post Operative Discharge Instructions    No driving for 24 hours after surgery and off of prescription pain medication. Avoid activities that bump or cause jarring movements at the surgical site for 10 days. Walking is encouraged after surgery. Stairs are ok to climb. DIET:    Diet as tolerated. Start with liquids then advance your diet based on how you fell. No alcoholic beverages for 24 hours after surgery or while on antibiotics or pain mdications. Drink plenty of water. MEDICATIONS:    Use daily stool softners (over the counter such as Colace or Senekot) while on pain medications. Resume pre-operative medications. If you are on any blood thinners see special instructions below. Use prescriptions given or Tylenol, Ibuprofen as needed for pain. Do not use more than 4000mg of Tylenol (acetaminophen) per day. Be aware this may be  in your prescription medication as well. Be aware narcotic prescriptions are tightly controlled in the state of [place]. If requiring more than one refill, a follow up appointment will be required. WOUND CARE:      You have skin glue on your incisions, you may shower in 24 hours and pat dry. Glue will fall off on it's own    Do not tub bathe, swim, or soak incisions until cleared to do so at your follow up. Ice bag to the affected area; 20 minutes on and 20 minutes off if desired. FOLLOW UP CARE:    You should have an appointment scheduled within 14 days after surgery. If this is not yet scheduled, call the office. Any forms that you need filled out regarding your medical care can be brought to the office at follow up appointment of faxed to: [id number] [facility] IF:  Temperature is over 101 degrees, a slight fever can be normal 24-48 hour after surgery. Nausea & vomiting that persists more than 24 hours after surgery. Your wound appears very red, hot, painful or swollen. Excessive bleeding occurs form the incision. *Between the hours 9-5 Monday-Friday please call the office at [phone].   If you do not receive a call back the same day, please do not hesitate to call my cell phone at [phone]    *If there is a medical emergency please go to the nearest emergency room immediately and do not hesitate to call my cell phone    [phone], after hours please call my cell phone

## 2022-09-29 NOTE — OP NOTES
Excision of Left lower extremity lipoma x3    Patient Name: Earlean Fleischer     SURGERY DATE: 22     : 1980     AGE: 40 y.o. Anesthesiologist: Anesthesiologist: Mendoza Caruso MD  CRNA: Jennifer Guo CRNA     Surgeon: Orion Wallace DO    Anesthesia: MAC, local    Surgical assist: Circ-1: Karma Tran RN  Circ-2: Deep Botello RN  Scrub Tech-1: Ye Fortune  Surg Asst-1: Pebbles Soto    PreOp DX: D17.24 LIPOMA LEFT LOWER EXTREMITY     PostOp DX: D1.24 LIPOMA LEFT LOWER EXTREMITY     Procedure: Left lower extremity lipoma x 3    Procedure Details: After informed consent was obtained the patient was taken to the operating room and placed in the supine position. MAC was administered by the anesthetist and titrate to effect. The left leg was prepped and draped in the usual sterile fashion and a time out procedure was performed. Next using a 15 blade scalpel vertical incision was made over each palpable mass. Blunt dissection was performed to completely remove each mass. Lipomas measured 2.5x2.5x0.5, 2.0x2.0x0.5 and 0.5x0.5x0.5cm. These were sent for specimens. The wounds were irrigated and suctioned dry and found to be hemostatic. The deep dermis of each incision was reapproximated with 3-0 vicryl in a simple interrupted fashion and skin incisions were then closed with 4-0 monocryl in a subcuticular manner. Dermabond dressings were applied. The patient tolerated the procedure well and was sent to recovery in stable condition.      Implants: * No implants in log *    Estimated Blood Loss:  5cc    Specimens:   ID Type Source Tests Collected by Time Destination   1 : left leg lipomas Preservative Leg, Left  Stanford Roxiey, DO 2022 1357 Pathology                Complications: None           Disposition: extubated, tolerated procedure well            Condition: Stable    Orion Wallace DO

## 2022-09-29 NOTE — ANESTHESIA PREPROCEDURE EVALUATION
Relevant Problems   RESPIRATORY SYSTEM   (+) Asthma      NEUROLOGY   (+) Anxiety and depression   (+) TREVOR (generalized anxiety disorder)       Anesthetic History   No history of anesthetic complications            Review of Systems / Medical History  Patient summary reviewed    Pulmonary          Smoker  Asthma : well controlled       Neuro/Psych         Psychiatric history    Comments: CLBP  Chronic pain  Anxiety  Depression  ADHD  psuedotumor cerebri Cardiovascular                  Exercise tolerance: >4 METS     GI/Hepatic/Renal  Within defined limits              Endo/Other        Arthritis     Other Findings              Physical Exam    Airway  Mallampati: II  TM Distance: > 6 cm  Neck ROM: normal range of motion   Mouth opening: Normal     Cardiovascular  Regular rate and rhythm,  S1 and S2 normal,  no murmur, click, rub, or gallop             Dental  No notable dental hx       Pulmonary  Breath sounds clear to auscultation               Abdominal  GI exam deferred       Other Findings            Anesthetic Plan    ASA: 3  Anesthesia type: general          Induction: Intravenous  Anesthetic plan and risks discussed with: Patient

## 2022-09-29 NOTE — INTERVAL H&P NOTE
Update History & Physical    The Patient's History and Physical of September 29, 2022 was reviewed with the patient and I examined the patient. There was no change. The surgical site was confirmed by the patient and me. Plan:  The risk, benefits, expected outcome, and alternative to the recommended procedure have been discussed with the patient. Patient understands and wants to proceed with the procedure.     Electronically signed by Delfina Lockett DO on 9/29/2022 at 1:13 PM

## 2022-09-29 NOTE — ANESTHESIA POSTPROCEDURE EVALUATION
Procedure(s):  EXCISION LEFT LOWER EXTREMITY LIPOMA TIMES THREE.    general    Anesthesia Post Evaluation      Multimodal analgesia: multimodal analgesia used between 6 hours prior to anesthesia start to PACU discharge  Patient location during evaluation: PACU  Patient participation: complete - patient participated  Level of consciousness: awake  Pain score: 2  Airway patency: patent  Anesthetic complications: no  Cardiovascular status: acceptable  Respiratory status: acceptable  Hydration status: acceptable  Post anesthesia nausea and vomiting:  none  Final Post Anesthesia Temperature Assessment:  Normothermia (36.0-37.5 degrees C)      INITIAL Post-op Vital signs:   Vitals Value Taken Time   /67 09/29/22 1503   Temp 36.4 °C (97.5 °F) 09/29/22 1435   Pulse 74 09/29/22 1510   Resp 22 09/29/22 1510   SpO2 96 % 09/29/22 1510   Vitals shown include unvalidated device data.

## 2022-10-16 NOTE — PROGRESS NOTES
CC:   Chief Complaint   Patient presents with    Post OP Follow Up     L lower extremity lipoma x3        Assessment:    ICD-10-CM ICD-9-CM    1. Lipoma of lower leg  D17.20 214.8           Plan: I reviewed the pathology report with the patient demonstrating benign lipomas. She is healing well without complications. There is no need for additional follow up unless there are questions or concerns in the future. She was released without restrictions. HPI:  Angie Sapp is a 40 y.o. female who is here today for initial follow up of removal of lower extremity lipoma on 9/29/2022. No fevers, chills or drainage from her incisions    Allergies: Allergies   Allergen Reactions    Fentanyl Hives    Pollen Extracts Itching       Medication Review:  Current Outpatient Medications on File Prior to Visit   Medication Sig Dispense Refill    estrogens, conjugated,-methylTESTOSTERone (ESTRATEST HS) 0.625-1.25 mg per tablet Take 1 Tablet by mouth daily.  meloxicam (MOBIC) 15 mg tablet Take 15 mg by mouth daily.  OTHER Delta * cream hands PRN      venlafaxine-SR (EFFEXOR-XR) 75 mg capsule Take 37.5 mg by mouth two (2) times a day.  albuterol (PROVENTIL HFA, VENTOLIN HFA, PROAIR HFA) 90 mcg/actuation inhaler Take 2 Puffs by inhalation every six (6) hours as needed for Wheezing or Shortness of Breath. 1 Inhaler 5    albuterol-ipratropium (DUO-NEB) 2.5 mg-0.5 mg/3 ml nebu 3 mL by Nebulization route every six (6) hours as needed for Wheezing or Shortness of Breath. 30 Nebule 3    montelukast (SINGULAIR) 10 mg tablet 1 Tablet daily 30 Tab 3    fluticasone propionate (FLONASE) 50 mcg/actuation nasal spray INSTILL TWO SQUIRTS IN THE NOSTRILS D UTD      dextroamphetamine-amphetamine (ADDERALL) 10 mg tablet Take 10 mg by mouth daily.  buPROPion (WELLBUTRIN) 100 mg tablet Take 1 Tab by mouth two (2) times a day.  (Patient taking differently: Take 150 mg by mouth daily.) 60 Tab 1    acetaminophen (TYLENOL EXTRA STRENGTH) 500 mg tablet Take 2 Tabs by mouth every eight (8) hours as needed for Pain. 99 Tab 11    ondansetron (ZOFRAN ODT) 8 mg disintegrating tablet Take 1 tablet by mouth every twelve (12) hours as needed for Nausea. (Patient not taking: No sig reported) 40 tablet 0    b complex vitamins tablet Take 1 Tab by mouth daily. (Patient not taking: No sig reported)       No current facility-administered medications on file prior to visit. Systems Review:  Review of Systems   Constitutional:  Negative for fever. PMH:  Past Medical History:   Diagnosis Date    Arthritis     hands, back    Asthma     No medications, doesn't have inhaler at home; flared by spring allergies    Attention deficit disorder (ADD)     Chronic back pain 2013    3/28/2014:  Chronic back pain, disk herniation 11 years ago, back surgery 5 years ago (doesn't want to have another back surgery) -- MRI at [facility] recently -- No relief with back surgery, ESIs used to help, but then became ineffective     Depression     Headache     Joint pain     Kidney infection     Muscle pain     Numbness and tingling     Pseudotumor cerebri     After first pregnancy, seemed to resolve but occasionally gets flares of HAs, sleep helps       Surgical History:  Past Surgical History:   Procedure Laterality Date    HX  SECTION      x 2    HX GYN      Partial Hysterectomy-Uterus removed.     HX LAP CHOLECYSTECTOMY      NEUROLOGICAL PROCEDURE UNLISTED      L5 discectomy       Social History:  Social History     Socioeconomic History    Marital status:    Tobacco Use    Smoking status: Former     Packs/day: 0.50     Years: 10.00     Pack years: 5.00     Types: Cigarettes     Quit date:      Years since quittin.7    Smokeless tobacco: Current    Tobacco comments:     vaping stopped    Substance and Sexual Activity    Alcohol use: Yes     Comment: 2/mo    Drug use: No    Sexual activity: Yes     Partners: Male Birth control/protection: Surgical       Family History:  Family History   Problem Relation Age of Onset   Rebbecca Hinders Migraines Mother     OSTEOARTHRITIS Mother     Asthma Mother     Migraines Father     Cancer Father         prostate, colon, testicular, liver, skin    Elevated Lipids Father    Rebbecca Hinders Migraines Sister     Asthma Sister     Migraines Maternal Grandmother        No visits with results within 3 Month(s) from this visit. Latest known visit with results is:   Office Visit on 03/02/2015   Component Date Value Ref Range Status    AMPHETAMINES UR POC 03/02/2015 Negative   Final    COCAINE UR POC 03/02/2015 Negative   Final    MDMA/ECSTASY UR POC 03/02/2015 Negative   Final    METHADONE UR POC 03/02/2015 Negative   Final    METHAMPHETAMINE UR POC 03/02/2015 Negative   Final    METHYLPHENIDATE UR POC 03/02/2015 Negative   Final    OPIATES UR POC 03/02/2015 Negative   Final    OXYCODONE UR POC 03/02/2015 Negative   Final    PHENCYCLIDINE UR POC 03/02/2015 Negative   Final    TRICYCLICS UR POC 52/04/0849 Presumptive Positive   Final    BARBITURATES UR POC 03/02/2015 Negative   Final    BENZODIAZEPINES UR POC 03/02/2015 Presumptive Positive   Final    CANNABINOIDS UR POC 03/02/2015 Negative   Final       No image results found. Physical Exam:  Visit Vitals  /86 (BP 1 Location: Left upper arm, BP Patient Position: Sitting)   Pulse 66   Temp 98 °F (36.7 °C) (Temporal)   Ht 5' 5\" (1.651 m)   Wt 85.3 kg (188 lb)   SpO2 95%   BMI 31.28 kg/m²    BMI: Body mass index is 31.28 kg/m². Physical Exam  Skin:     General: Skin is warm and dry. Comments: 3 lower extremity incisions well healed     I have reviewed the information entered by the clinical staff and/or patient and verified it as accurate or edited where necessary.      Electronically signed by:    Lisa Concepcion DO, MPH

## 2022-10-17 ENCOUNTER — OFFICE VISIT (OUTPATIENT)
Dept: SURGERY | Age: 42
End: 2022-10-17
Payer: COMMERCIAL

## 2022-10-17 VITALS
HEIGHT: 65 IN | WEIGHT: 188 LBS | DIASTOLIC BLOOD PRESSURE: 86 MMHG | HEART RATE: 66 BPM | TEMPERATURE: 98 F | SYSTOLIC BLOOD PRESSURE: 131 MMHG | BODY MASS INDEX: 31.32 KG/M2 | OXYGEN SATURATION: 95 %

## 2022-10-17 DIAGNOSIS — D17.20 LIPOMA OF LOWER LEG: Primary | ICD-10-CM

## 2022-10-17 PROCEDURE — 99024 POSTOP FOLLOW-UP VISIT: CPT | Performed by: SURGERY

## 2022-10-17 RX ORDER — ESTERIFIED ESTROGEN AND METHYLTESTOSTERONE .625; 1.25 MG/1; MG/1
1 TABLET ORAL DAILY
COMMUNITY
Start: 2022-10-05

## 2022-10-17 RX ORDER — MELOXICAM 15 MG/1
15 TABLET ORAL DAILY
COMMUNITY
Start: 2022-10-14

## 2022-10-17 NOTE — PROGRESS NOTES
Barrett Griselda is a 40 y.o. female (: 1980) presenting to address:    Chief Complaint   Patient presents with    Post OP Follow Up     L lower extremity lipoma x3       Medication list and allergies have been reviewed with [facility] and updated as of today's date. I have gone over all Medical, Surgical and Social History with [facility] and updated/added the information accordingly. 1. Have you been to the ER, Urgent Care or Hospitalized since your last visit? NO      2. Have you followed up with your PCP or any other Physicians since your procedure/ last office visit?    YES

## 2024-01-09 ENCOUNTER — OFFICE VISIT (OUTPATIENT)
Facility: CLINIC | Age: 44
End: 2024-01-09
Payer: COMMERCIAL

## 2024-01-09 ENCOUNTER — HOSPITAL ENCOUNTER (OUTPATIENT)
Facility: HOSPITAL | Age: 44
Setting detail: SPECIMEN
Discharge: HOME | End: 2024-01-12
Payer: COMMERCIAL

## 2024-01-09 VITALS
HEIGHT: 65 IN | WEIGHT: 186 LBS | TEMPERATURE: 97.2 F | DIASTOLIC BLOOD PRESSURE: 90 MMHG | BODY MASS INDEX: 30.99 KG/M2 | HEART RATE: 83 BPM | SYSTOLIC BLOOD PRESSURE: 136 MMHG

## 2024-01-09 DIAGNOSIS — E66.09 CLASS 1 OBESITY DUE TO EXCESS CALORIES WITHOUT SERIOUS COMORBIDITY WITH BODY MASS INDEX (BMI) OF 30.0 TO 30.9 IN ADULT: ICD-10-CM

## 2024-01-09 DIAGNOSIS — Z11.59 ENCOUNTER FOR HEPATITIS C SCREENING TEST FOR LOW RISK PATIENT: ICD-10-CM

## 2024-01-09 DIAGNOSIS — J45.50 SEVERE PERSISTENT ASTHMA WITHOUT COMPLICATION: ICD-10-CM

## 2024-01-09 DIAGNOSIS — F41.9 ANXIETY AND DEPRESSION: ICD-10-CM

## 2024-01-09 DIAGNOSIS — Z23 ENCOUNTER FOR IMMUNIZATION: ICD-10-CM

## 2024-01-09 DIAGNOSIS — Z11.4 SCREENING FOR HIV WITHOUT PRESENCE OF RISK FACTORS: ICD-10-CM

## 2024-01-09 DIAGNOSIS — F32.A ANXIETY AND DEPRESSION: ICD-10-CM

## 2024-01-09 DIAGNOSIS — Z78.0 MENOPAUSE: ICD-10-CM

## 2024-01-09 DIAGNOSIS — Z76.89 ENCOUNTER TO ESTABLISH CARE: ICD-10-CM

## 2024-01-09 DIAGNOSIS — Z76.89 ENCOUNTER TO ESTABLISH CARE: Primary | ICD-10-CM

## 2024-01-09 DIAGNOSIS — F98.8 ATTENTION DEFICIT DISORDER (ADD) WITHOUT HYPERACTIVITY: ICD-10-CM

## 2024-01-09 DIAGNOSIS — Z72.0 VAPES NICOTINE CONTAINING SUBSTANCE: ICD-10-CM

## 2024-01-09 PROBLEM — E66.811 CLASS 1 OBESITY DUE TO EXCESS CALORIES WITHOUT SERIOUS COMORBIDITY WITH BODY MASS INDEX (BMI) OF 30.0 TO 30.9 IN ADULT: Status: ACTIVE | Noted: 2024-01-09

## 2024-01-09 LAB
ALBUMIN SERPL-MCNC: 3.9 G/DL (ref 3.4–5)
ALBUMIN/GLOB SERPL: 1.1 (ref 0.8–1.7)
ALP SERPL-CCNC: 79 U/L (ref 45–117)
ALT SERPL-CCNC: 26 U/L (ref 13–56)
ANION GAP SERPL CALC-SCNC: 4 MMOL/L (ref 3–18)
AST SERPL-CCNC: 14 U/L (ref 10–38)
BILIRUB SERPL-MCNC: 0.2 MG/DL (ref 0.2–1)
BUN SERPL-MCNC: 13 MG/DL (ref 7–18)
BUN/CREAT SERPL: 22 (ref 12–20)
CALCIUM SERPL-MCNC: 8.8 MG/DL (ref 8.5–10.1)
CHLORIDE SERPL-SCNC: 111 MMOL/L (ref 100–111)
CHOLEST SERPL-MCNC: 190 MG/DL
CO2 SERPL-SCNC: 26 MMOL/L (ref 21–32)
CREAT SERPL-MCNC: 0.58 MG/DL (ref 0.6–1.3)
ERYTHROCYTE [DISTWIDTH] IN BLOOD BY AUTOMATED COUNT: 13 % (ref 11.6–14.5)
EST. AVERAGE GLUCOSE BLD GHB EST-MCNC: 100 MG/DL
GLOBULIN SER CALC-MCNC: 3.5 G/DL (ref 2–4)
GLUCOSE SERPL-MCNC: 98 MG/DL (ref 74–99)
HBA1C MFR BLD: 5.1 % (ref 4.2–5.6)
HCT VFR BLD AUTO: 44.5 % (ref 35–45)
HDLC SERPL-MCNC: 81 MG/DL (ref 40–60)
HDLC SERPL: 2.3 (ref 0–5)
HGB BLD-MCNC: 14.2 G/DL (ref 12–16)
LDLC SERPL CALC-MCNC: 71.2 MG/DL (ref 0–100)
LIPID PANEL: ABNORMAL
MCH RBC QN AUTO: 31.6 PG (ref 24–34)
MCHC RBC AUTO-ENTMCNC: 31.9 G/DL (ref 31–37)
MCV RBC AUTO: 99.1 FL (ref 78–100)
NRBC # BLD: 0 K/UL (ref 0–0.01)
NRBC BLD-RTO: 0 PER 100 WBC
PLATELET # BLD AUTO: 334 K/UL (ref 135–420)
PMV BLD AUTO: 9.3 FL (ref 9.2–11.8)
POTASSIUM SERPL-SCNC: 4.3 MMOL/L (ref 3.5–5.5)
PROT SERPL-MCNC: 7.4 G/DL (ref 6.4–8.2)
RBC # BLD AUTO: 4.49 M/UL (ref 4.2–5.3)
SODIUM SERPL-SCNC: 141 MMOL/L (ref 136–145)
TRIGL SERPL-MCNC: 189 MG/DL
VLDLC SERPL CALC-MCNC: 37.8 MG/DL
WBC # BLD AUTO: 7.5 K/UL (ref 4.6–13.2)

## 2024-01-09 PROCEDURE — 36415 COLL VENOUS BLD VENIPUNCTURE: CPT

## 2024-01-09 PROCEDURE — 80061 LIPID PANEL: CPT

## 2024-01-09 PROCEDURE — 87389 HIV-1 AG W/HIV-1&-2 AB AG IA: CPT

## 2024-01-09 PROCEDURE — 86706 HEP B SURFACE ANTIBODY: CPT

## 2024-01-09 PROCEDURE — 90715 TDAP VACCINE 7 YRS/> IM: CPT | Performed by: STUDENT IN AN ORGANIZED HEALTH CARE EDUCATION/TRAINING PROGRAM

## 2024-01-09 PROCEDURE — 90472 IMMUNIZATION ADMIN EACH ADD: CPT | Performed by: STUDENT IN AN ORGANIZED HEALTH CARE EDUCATION/TRAINING PROGRAM

## 2024-01-09 PROCEDURE — 90674 CCIIV4 VAC NO PRSV 0.5 ML IM: CPT | Performed by: STUDENT IN AN ORGANIZED HEALTH CARE EDUCATION/TRAINING PROGRAM

## 2024-01-09 PROCEDURE — 85027 COMPLETE CBC AUTOMATED: CPT

## 2024-01-09 PROCEDURE — 80053 COMPREHEN METABOLIC PANEL: CPT

## 2024-01-09 PROCEDURE — 90677 PCV20 VACCINE IM: CPT | Performed by: STUDENT IN AN ORGANIZED HEALTH CARE EDUCATION/TRAINING PROGRAM

## 2024-01-09 PROCEDURE — 99204 OFFICE O/P NEW MOD 45 MIN: CPT | Performed by: STUDENT IN AN ORGANIZED HEALTH CARE EDUCATION/TRAINING PROGRAM

## 2024-01-09 PROCEDURE — 90471 IMMUNIZATION ADMIN: CPT | Performed by: STUDENT IN AN ORGANIZED HEALTH CARE EDUCATION/TRAINING PROGRAM

## 2024-01-09 PROCEDURE — 83036 HEMOGLOBIN GLYCOSYLATED A1C: CPT

## 2024-01-09 PROCEDURE — 86803 HEPATITIS C AB TEST: CPT

## 2024-01-09 RX ORDER — IPRATROPIUM BROMIDE AND ALBUTEROL SULFATE 2.5; .5 MG/3ML; MG/3ML
3 SOLUTION RESPIRATORY (INHALATION) EVERY 6 HOURS PRN
Qty: 360 ML | Refills: 5 | Status: SHIPPED | OUTPATIENT
Start: 2024-01-09

## 2024-01-09 RX ORDER — IPRATROPIUM BROMIDE AND ALBUTEROL SULFATE 2.5; .5 MG/3ML; MG/3ML
3 SOLUTION RESPIRATORY (INHALATION) EVERY 6 HOURS PRN
Qty: 360 ML | Refills: 5 | Status: SHIPPED | OUTPATIENT
Start: 2024-01-09 | End: 2024-01-09

## 2024-01-09 SDOH — ECONOMIC STABILITY: HOUSING INSECURITY
IN THE LAST 12 MONTHS, WAS THERE A TIME WHEN YOU DID NOT HAVE A STEADY PLACE TO SLEEP OR SLEPT IN A SHELTER (INCLUDING NOW)?: NO

## 2024-01-09 SDOH — ECONOMIC STABILITY: INCOME INSECURITY: HOW HARD IS IT FOR YOU TO PAY FOR THE VERY BASICS LIKE FOOD, HOUSING, MEDICAL CARE, AND HEATING?: NOT VERY HARD

## 2024-01-09 SDOH — ECONOMIC STABILITY: FOOD INSECURITY: WITHIN THE PAST 12 MONTHS, YOU WORRIED THAT YOUR FOOD WOULD RUN OUT BEFORE YOU GOT MONEY TO BUY MORE.: NEVER TRUE

## 2024-01-09 SDOH — ECONOMIC STABILITY: FOOD INSECURITY: WITHIN THE PAST 12 MONTHS, THE FOOD YOU BOUGHT JUST DIDN'T LAST AND YOU DIDN'T HAVE MONEY TO GET MORE.: NEVER TRUE

## 2024-01-09 ASSESSMENT — PATIENT HEALTH QUESTIONNAIRE - PHQ9
SUM OF ALL RESPONSES TO PHQ QUESTIONS 1-9: 15
5. POOR APPETITE OR OVEREATING: 0
10. IF YOU CHECKED OFF ANY PROBLEMS, HOW DIFFICULT HAVE THESE PROBLEMS MADE IT FOR YOU TO DO YOUR WORK, TAKE CARE OF THINGS AT HOME, OR GET ALONG WITH OTHER PEOPLE: 2
SUM OF ALL RESPONSES TO PHQ QUESTIONS 1-9: 15
8. MOVING OR SPEAKING SO SLOWLY THAT OTHER PEOPLE COULD HAVE NOTICED. OR THE OPPOSITE, BEING SO FIGETY OR RESTLESS THAT YOU HAVE BEEN MOVING AROUND A LOT MORE THAN USUAL: 0
SUM OF ALL RESPONSES TO PHQ QUESTIONS 1-9: 15
9. THOUGHTS THAT YOU WOULD BE BETTER OFF DEAD, OR OF HURTING YOURSELF: 0
4. FEELING TIRED OR HAVING LITTLE ENERGY: 3
1. LITTLE INTEREST OR PLEASURE IN DOING THINGS: 3
7. TROUBLE CONCENTRATING ON THINGS, SUCH AS READING THE NEWSPAPER OR WATCHING TELEVISION: 3
SUM OF ALL RESPONSES TO PHQ QUESTIONS 1-9: 15
2. FEELING DOWN, DEPRESSED OR HOPELESS: 3
3. TROUBLE FALLING OR STAYING ASLEEP: 3
SUM OF ALL RESPONSES TO PHQ9 QUESTIONS 1 & 2: 6
6. FEELING BAD ABOUT YOURSELF - OR THAT YOU ARE A FAILURE OR HAVE LET YOURSELF OR YOUR FAMILY DOWN: 0

## 2024-01-09 NOTE — PROGRESS NOTES
Meche Livingston is a 43 y.o. year old female who presents today for   Chief Complaint   Patient presents with    New Patient     Menopause is out of control       Is someone accompanying this pt? no    Is the patient using any DME equipment during OV? no    Depression Screenin/9/2024     9:18 AM   PHQ-9 Questionaire   Little interest or pleasure in doing things 3   Feeling down, depressed, or hopeless 3   Trouble falling or staying asleep, or sleeping too much 3   Feeling tired or having little energy 3   Poor appetite or overeating 0   Feeling bad about yourself - or that you are a failure or have let yourself or your family down 0   Trouble concentrating on things, such as reading the newspaper or watching television 3   Moving or speaking so slowly that other people could have noticed. Or the opposite - being so fidgety or restless that you have been moving around a lot more than usual 0   Thoughts that you would be better off dead, or of hurting yourself in some way 0   PHQ-9 Total Score 15   If you checked off any problems, how difficult have these problems made it for you to do your work, take care of things at home, or get along with other people? 2       Abuse Screenin/9/2024     9:00 AM   AMB Abuse Screening   Do you ever feel afraid of your partner? N   Are you in a relationship with someone who physically or mentally threatens you? N   Is it safe for you to go home? Y       Learning Assessment:  Who is the primary learner? Patient    What is the preferred language for health care of the primary learner? ENGLISH    How does the primary learner prefer to learn new concepts? LISTENING    Answered By patient    Relationship to Learner SELF    Highest level of education completed by primary learner? SOME COLLEGE    Are there any barriers / factors that could impact learning? COGNITIVE        Fall Risk:       No data to display                    Coordination of Care:   1. \"Have you been to the 
Patient History     Smoking Tobacco Use: Former     Smokeless Tobacco Use: Current     Passive Exposure: Not on file   Alcohol Use: Not on file   Financial Resource Strain: Low Risk  (1/9/2024)    Overall Financial Resource Strain (CARDIA)     Difficulty of Paying Living Expenses: Not very hard   Food Insecurity: No Food Insecurity (1/9/2024)    Hunger Vital Sign     Worried About Running Out of Food in the Last Year: Never true     Ran Out of Food in the Last Year: Never true   Transportation Needs: Unknown (1/9/2024)    PRAPARE - Transportation     Lack of Transportation (Medical): Not on file     Lack of Transportation (Non-Medical): No   Physical Activity: Not on file   Stress: Not on file   Social Connections: Not on file   Intimate Partner Violence: Not on file   Depression: At risk (1/9/2024)    PHQ-2     PHQ-2 Score: 15   Housing Stability: Unknown (1/9/2024)    Housing Stability Vital Sign     Unable to Pay for Housing in the Last Year: Not on file     Number of Places Lived in the Last Year: Not on file     Unstable Housing in the Last Year: No   Interpersonal Safety: Not on file   Utilities: Not on file              1/9/2024     9:18 AM   PHQ-9    Little interest or pleasure in doing things 3   Feeling down, depressed, or hopeless 3   Trouble falling or staying asleep, or sleeping too much 3   Feeling tired or having little energy 3   Poor appetite or overeating 0   Feeling bad about yourself - or that you are a failure or have let yourself or your family down 0   Trouble concentrating on things, such as reading the newspaper or watching television 3   Moving or speaking so slowly that other people could have noticed. Or the opposite - being so fidgety or restless that you have been moving around a lot more than usual 0   Thoughts that you would be better off dead, or of hurting yourself in some way 0   PHQ-2 Score 6   PHQ-9 Total Score 15   If you checked off any problems, how difficult have these 
Name band;

## 2024-01-09 NOTE — ASSESSMENT & PLAN NOTE
- uncontrolled, likely due to no controlled  - trial of home duoneb; will settle on a controlled based on response to duoneb

## 2024-01-09 NOTE — PATIENT INSTRUCTIONS
It was nice meeting you today.  Please have your labs done either immediately after this visit, or you can schedule at the  to come back for labs.  Please do your labs at least a week before your next appointment.    If you have questions or concerns, My Chart is the fastest way to get in touch with me and the clinical staff.    Please arrive to the clinic at least 10 minutes before your appointment. This will insure you have the most amount of time with the medical provider.  If you arrive after the start of your appointment, you may have to reschedule.      For asthma: try the Duoneb daily. It might reduce the need for the albuterol. We will see how you're doing in next visit.  
Neuro

## 2024-01-10 LAB
HBV SURFACE AB SER QL IA: POSITIVE
HBV SURFACE AB SERPL IA-ACNC: 229.68 MIU/ML
HEP BS AB COMMENT: NORMAL
HIV 1+2 AB+HIV1 P24 AG SERPL QL IA: NONREACTIVE
HIV 1/2 RESULT COMMENT: NORMAL

## 2024-01-11 LAB
HCV AB SERPL QL IA: NORMAL
HCV IGG SERPL QL IA: NON REACTIVE S/CO RATIO

## 2024-01-16 ENCOUNTER — OFFICE VISIT (OUTPATIENT)
Facility: CLINIC | Age: 44
End: 2024-01-16
Payer: COMMERCIAL

## 2024-01-16 ENCOUNTER — HOSPITAL ENCOUNTER (OUTPATIENT)
Facility: HOSPITAL | Age: 44
Setting detail: SPECIMEN
Discharge: HOME | End: 2024-01-19
Payer: COMMERCIAL

## 2024-01-16 VITALS
SYSTOLIC BLOOD PRESSURE: 127 MMHG | HEIGHT: 65 IN | TEMPERATURE: 97.4 F | DIASTOLIC BLOOD PRESSURE: 90 MMHG | BODY MASS INDEX: 32.02 KG/M2 | OXYGEN SATURATION: 98 % | HEART RATE: 81 BPM | WEIGHT: 192.2 LBS | RESPIRATION RATE: 17 BRPM

## 2024-01-16 DIAGNOSIS — E55.9 VITAMIN D DEFICIENCY: Primary | ICD-10-CM

## 2024-01-16 DIAGNOSIS — E55.9 VITAMIN D DEFICIENCY: ICD-10-CM

## 2024-01-16 DIAGNOSIS — Z72.820 POOR SLEEP: ICD-10-CM

## 2024-01-16 DIAGNOSIS — N95.1 HOT FLASHES DUE TO MENOPAUSE: ICD-10-CM

## 2024-01-16 LAB
25(OH)D3 SERPL-MCNC: 15.9 NG/ML (ref 30–100)
T4 FREE SERPL-MCNC: 0.8 NG/DL (ref 0.7–1.5)
TSH SERPL DL<=0.05 MIU/L-ACNC: 1.38 UIU/ML (ref 0.36–3.74)

## 2024-01-16 PROCEDURE — 83002 ASSAY OF GONADOTROPIN (LH): CPT

## 2024-01-16 PROCEDURE — 84439 ASSAY OF FREE THYROXINE: CPT

## 2024-01-16 PROCEDURE — 83001 ASSAY OF GONADOTROPIN (FSH): CPT

## 2024-01-16 PROCEDURE — 82306 VITAMIN D 25 HYDROXY: CPT

## 2024-01-16 PROCEDURE — 84144 ASSAY OF PROGESTERONE: CPT

## 2024-01-16 PROCEDURE — 99214 OFFICE O/P EST MOD 30 MIN: CPT | Performed by: STUDENT IN AN ORGANIZED HEALTH CARE EDUCATION/TRAINING PROGRAM

## 2024-01-16 PROCEDURE — 84443 ASSAY THYROID STIM HORMONE: CPT

## 2024-01-16 PROCEDURE — 82670 ASSAY OF TOTAL ESTRADIOL: CPT

## 2024-01-16 PROCEDURE — 36415 COLL VENOUS BLD VENIPUNCTURE: CPT

## 2024-01-16 RX ORDER — GABAPENTIN 100 MG/1
CAPSULE ORAL
Qty: 120 CAPSULE | Refills: 0 | Status: SHIPPED | OUTPATIENT
Start: 2024-01-16 | End: 2024-03-19

## 2024-01-16 NOTE — PATIENT INSTRUCTIONS
YOU CAN INCREASE YOUR GABAPENTIN BY 1 PILL EVERY 3-4 DAYS UNTIL YOUR SYMPTOMS ARE CONTROLLED. MAXIMUM OF 6 PILLS.

## 2024-01-16 NOTE — PROGRESS NOTES
Meche Livingston is a 43 y.o. year old female who presents today for   Chief Complaint   Patient presents with    Follow-up     menopause       Is someone accompanying this pt? no    Is the patient using any DME equipment during OV? no    Depression Screenin/9/2024     9:18 AM   PHQ-9 Questionaire   Little interest or pleasure in doing things 3   Feeling down, depressed, or hopeless 3   Trouble falling or staying asleep, or sleeping too much 3   Feeling tired or having little energy 3   Poor appetite or overeating 0   Feeling bad about yourself - or that you are a failure or have let yourself or your family down 0   Trouble concentrating on things, such as reading the newspaper or watching television 3   Moving or speaking so slowly that other people could have noticed. Or the opposite - being so fidgety or restless that you have been moving around a lot more than usual 0   Thoughts that you would be better off dead, or of hurting yourself in some way 0   PHQ-9 Total Score 15   If you checked off any problems, how difficult have these problems made it for you to do your work, take care of things at home, or get along with other people? 2       Abuse Screenin/9/2024     9:00 AM   AMB Abuse Screening   Do you ever feel afraid of your partner? N   Are you in a relationship with someone who physically or mentally threatens you? N   Is it safe for you to go home? Y       Learning Assessment:  No question data found.    Fall Risk:       No data to display                    Coordination of Care:   1. \"Have you been to the ER, urgent care clinic since your last visit?  Hospitalized since your last visit?\" no    2. \"Have you seen or consulted any other health care providers outside of the [facility] since your last visit?\" no    3. For patients aged 45-75: Has the patient had a colonoscopy / FIT/ Cologuard? N/a    If the patient is female:    4. For patients aged 40-74: Has the patient had a

## 2024-01-16 NOTE — PROGRESS NOTES
Meche Livingston (:  1980) is a 43 y.o. female here for evaluation of the following chief complaint(s):  Follow-up (menopause)        ASSESSMENT/PLAN:  1. Vitamin D deficiency  -     Vitamin D 25 Hydroxy; Future  2. Hot flashes due to menopause  -     gabapentin (NEURONTIN) 100 MG capsule; TAKE 1 PILL NIGHTLY. INCREASE BY 1 PILL EVERY 3 DAYS UNTIL SYMPTOMS ARE CONTROLLED. MAX 6 PILLS/DAY., Disp-120 capsule, R-0Normal  3. Poor sleep  -     T4, Free; Future  -     TSH; Future          SUBJECTIVE/OBJECTIVE:  HPI  Menopause  - dx'd with labs by previous PCP at age 42  - hot flashes, mood fluctuation, forgetfullness, poor sleep maintenance (due to hot flashes)  - was controlled on Estratest; currently on effexor 75 BID  Update (24)  -  no results for previous hormone labs, thought to be premenopausal  - did have success with ambien      ROS as stated above.    BP (!) 127/90 (Site: Right Upper Arm, Position: Sitting, Cuff Size: Large Adult)   Pulse 81   Temp 97.4 °F (36.3 °C)   Resp 17   Ht 1.651 m (5' 5\")   Wt 87.2 kg (192 lb 3.2 oz)   LMP  (LMP Unknown) Comment: hysterectomy  SpO2 98%   BMI 31.98 kg/m²     Physical Exam  Thyroid NL, NO NODULES    On this date 2024 I have spent 35 minutes reviewing previous notes, test results and face to face with the patient discussing the diagnosis and importance of compliance with the treatment plan as well as documenting on the day of the visit.      An electronic signature was used to authenticate this note.    --Enzo Thopmson MD

## 2024-01-17 LAB
ESTRADIOL SERPL-MCNC: 218 PG/ML
FSH SERPL-ACNC: 4.9 MIU/ML
LH SERPL-ACNC: 9.2 MIU/ML
PROGEST SERPL-MCNC: 0.2 NG/ML

## 2024-02-16 ENCOUNTER — OFFICE VISIT (OUTPATIENT)
Facility: CLINIC | Age: 44
End: 2024-02-16

## 2024-02-16 VITALS
HEART RATE: 77 BPM | RESPIRATION RATE: 18 BRPM | OXYGEN SATURATION: 99 % | SYSTOLIC BLOOD PRESSURE: 122 MMHG | WEIGHT: 193.8 LBS | TEMPERATURE: 97.7 F | HEIGHT: 65 IN | DIASTOLIC BLOOD PRESSURE: 80 MMHG | BODY MASS INDEX: 32.29 KG/M2

## 2024-02-16 DIAGNOSIS — E55.9 VITAMIN D DEFICIENCY: ICD-10-CM

## 2024-02-16 DIAGNOSIS — J45.20 MILD INTERMITTENT ASTHMA WITHOUT COMPLICATION: ICD-10-CM

## 2024-02-16 DIAGNOSIS — Z78.0 MENOPAUSE: Primary | ICD-10-CM

## 2024-02-16 RX ORDER — FLUTICASONE PROPIONATE 50 MCG
2 SPRAY, SUSPENSION (ML) NASAL DAILY
Qty: 1 EACH | Refills: 5 | Status: SHIPPED | OUTPATIENT
Start: 2024-02-16

## 2024-02-16 RX ORDER — ALBUTEROL SULFATE 90 UG/1
2 AEROSOL, METERED RESPIRATORY (INHALATION) EVERY 6 HOURS PRN
Qty: 18 G | Refills: 5 | Status: SHIPPED | OUTPATIENT
Start: 2024-02-16

## 2024-02-16 RX ORDER — ZOLPIDEM TARTRATE 10 MG/1
10 TABLET ORAL NIGHTLY PRN
Qty: 90 TABLET | Refills: 3 | Status: SHIPPED | OUTPATIENT
Start: 2024-02-16 | End: 2025-02-10

## 2024-02-16 RX ORDER — ERGOCALCIFEROL 1.25 MG/1
50000 CAPSULE ORAL WEEKLY
Qty: 12 CAPSULE | Refills: 1 | Status: SHIPPED | OUTPATIENT
Start: 2024-02-16

## 2024-02-16 NOTE — PROGRESS NOTES
Meche Livingston (:  1980) is a 43 y.o. female here for evaluation of the following chief complaint(s):  Follow-up (Menopause follow up)        ASSESSMENT/PLAN:  1. Menopause  Assessment & Plan:  - poor sleep maintenance  - trial of Ambien; pt tolerated it well   Orders:  -     zolpidem (AMBIEN) 10 MG tablet; Take 1 tablet by mouth nightly as needed for Sleep for up to 360 days. Max Daily Amount: 10 mg, Disp-90 tablet, R-3Normal  2. Vitamin D deficiency  Assessment & Plan:  - deficiency levels  - start supplement, D2 50K IU weekly x 3 months, followed by D3 2K IU daily  - check levels in 6 months   Orders:  -     vitamin D (ERGOCALCIFEROL) 1.25 MG (70379 UT) CAPS capsule; Take 1 capsule by mouth once a week, Disp-12 capsule, R-1Normal  3. Mild intermittent asthma without complication  Assessment & Plan:  - Spring allergy sxs starting  - start flonase   Orders:  -     albuterol sulfate HFA (PROVENTIL;VENTOLIN;PROAIR) 108 (90 Base) MCG/ACT inhaler; Inhale 2 puffs into the lungs every 6 hours as needed for Wheezing or Shortness of Breath, Disp-18 g, R-5Normal  -     fluticasone (FLONASE) 50 MCG/ACT nasal spray; 2 sprays by Each Nostril route daily, Disp-1 each, R-5Normal      Follow-up and Dispositions    Return in about 6 months (around 2024) for vit D.         SUBJECTIVE/OBJECTIVE:  HPI  Menopause  - dx'd with labs by previous PCP at age 42  - hot flashes, mood fluctuation, forgetfullness, poor sleep maintenance (due to hot flashes)  - was controlled on Estratest but lost effect; currently on effexor 75 BID  Update (24)  -  no results for previous hormone labs, thought to be premenopausal  - did have success with ambien  Update (24)  - gabapentin does not help; took as much as 600mg  - wellbutrin x years  - biggest complaint of menopause is trouble sleeping  - wellbutrin and effexor control her mood     Vit D def  - hx of def  - interested in replacement      ROS as stated above.    /80

## 2024-02-16 NOTE — PROGRESS NOTES
Meche Livingston is a 43 y.o. year old female who presents today for   Chief Complaint   Patient presents with    Follow-up     Menopause follow up       Is someone accompanying this pt? no    Is the patient using any DME equipment during OV? no    Depression Screenin/9/2024     9:18 AM   PHQ-9 Questionaire   Little interest or pleasure in doing things 3   Feeling down, depressed, or hopeless 3   Trouble falling or staying asleep, or sleeping too much 3   Feeling tired or having little energy 3   Poor appetite or overeating 0   Feeling bad about yourself - or that you are a failure or have let yourself or your family down 0   Trouble concentrating on things, such as reading the newspaper or watching television 3   Moving or speaking so slowly that other people could have noticed. Or the opposite - being so fidgety or restless that you have been moving around a lot more than usual 0   Thoughts that you would be better off dead, or of hurting yourself in some way 0   PHQ-9 Total Score 15   If you checked off any problems, how difficult have these problems made it for you to do your work, take care of things at home, or get along with other people? 2       Abuse Screenin/9/2024     9:00 AM   AMB Abuse Screening   Do you ever feel afraid of your partner? N   Are you in a relationship with someone who physically or mentally threatens you? N   Is it safe for you to go home? Y       Learning Assessment:  No question data found.    Fall Risk:       No data to display                    Coordination of Care:   1. \"Have you been to the ER, urgent care clinic since your last visit?  Hospitalized since your last visit?\" no    2. \"Have you seen or consulted any other health care providers outside of the [facility] since your last visit?\" no    3. For patients aged 45-75: Has the patient had a colonoscopy / FIT/ Cologuard? N/a    If the patient is female:    4. For patients aged 40-74: Has the patient

## 2024-02-16 NOTE — ASSESSMENT & PLAN NOTE
- deficiency levels  - start supplement, D2 50K IU weekly x 3 months, followed by D3 2K IU daily  - check levels in 6 months

## 2024-04-17 ENCOUNTER — OFFICE VISIT (OUTPATIENT)
Facility: CLINIC | Age: 44
End: 2024-04-17
Payer: COMMERCIAL

## 2024-04-17 VITALS
HEART RATE: 81 BPM | OXYGEN SATURATION: 96 % | SYSTOLIC BLOOD PRESSURE: 112 MMHG | WEIGHT: 185 LBS | DIASTOLIC BLOOD PRESSURE: 76 MMHG | BODY MASS INDEX: 30.82 KG/M2 | TEMPERATURE: 97.9 F | HEIGHT: 65 IN | RESPIRATION RATE: 16 BRPM

## 2024-04-17 DIAGNOSIS — F98.8 ATTENTION DEFICIT DISORDER (ADD) WITHOUT HYPERACTIVITY: Primary | ICD-10-CM

## 2024-04-17 DIAGNOSIS — N62 MACROMASTIA: ICD-10-CM

## 2024-04-17 DIAGNOSIS — G89.29 CHRONIC BILATERAL LOW BACK PAIN WITH BILATERAL SCIATICA: ICD-10-CM

## 2024-04-17 DIAGNOSIS — M54.42 CHRONIC BILATERAL LOW BACK PAIN WITH BILATERAL SCIATICA: ICD-10-CM

## 2024-04-17 DIAGNOSIS — M54.41 CHRONIC BILATERAL LOW BACK PAIN WITH BILATERAL SCIATICA: ICD-10-CM

## 2024-04-17 PROCEDURE — 99214 OFFICE O/P EST MOD 30 MIN: CPT | Performed by: STUDENT IN AN ORGANIZED HEALTH CARE EDUCATION/TRAINING PROGRAM

## 2024-04-17 RX ORDER — DEXTROAMPHETAMINE SACCHARATE, AMPHETAMINE ASPARTATE MONOHYDRATE, DEXTROAMPHETAMINE SULFATE, AMPHETAMINE SULFATE 6.25; 6.25; 6.25; 6.25 MG/1; MG/1; MG/1; MG/1
1 CAPSULE, EXTENDED RELEASE ORAL DAILY
Qty: 30 CAPSULE | Refills: 0 | Status: SHIPPED | OUTPATIENT
Start: 2024-04-17 | End: 2024-05-17

## 2024-04-17 RX ORDER — DEXTROAMPHETAMINE SACCHARATE, AMPHETAMINE ASPARTATE, DEXTROAMPHETAMINE SULFATE AND AMPHETAMINE SULFATE 2.5; 2.5; 2.5; 2.5 MG/1; MG/1; MG/1; MG/1
10 TABLET ORAL DAILY
Qty: 30 TABLET | Refills: 0 | Status: SHIPPED | OUTPATIENT
Start: 2024-04-17 | End: 2024-05-17

## 2024-04-17 RX ORDER — DEXTROAMPHETAMINE SACCHARATE, AMPHETAMINE ASPARTATE, DEXTROAMPHETAMINE SULFATE AND AMPHETAMINE SULFATE 5; 5; 5; 5 MG/1; MG/1; MG/1; MG/1
20 TABLET ORAL DAILY
COMMUNITY
End: 2024-04-17

## 2024-04-17 NOTE — PROGRESS NOTES
Meche Livingston (:  1980) is a 43 y.o. female here for evaluation of the following chief complaint(s):  Other (Adderal discussion/refill)        ASSESSMENT/PLAN:  1. Attention deficit disorder (ADD) without hyperactivity  Assessment & Plan:  - checked, no suspicious activity  -Refills appropriate   Orders:  -     Amphet-Dextroamphet 3-Bead ER 25 MG CP24; Take 1 capsule by mouth daily for 30 days. Max Daily Amount: 1 capsule, Disp-30 capsule, R-0Normal  -     amphetamine-dextroamphetamine (ADDERALL) 10 MG tablet; Take 1 tablet by mouth daily for 30 days. Max Daily Amount: 10 mg, Disp-30 tablet, R-0Normal  2. Chronic bilateral low back pain with bilateral sciatica  Assessment & Plan:  -Exacerbated by having breast  -Physical therapy and anti-inflammatories   Orders:  -     [facility]  3. Macromastia  Assessment & Plan:  - Likely contributing to low back pain and shoulder pain  -Past mammogram showed dense breast tissue, so weight loss would likely not reduce breast burden   -Physical therapy and anti-inflammatories for now       Follow-up and Dispositions    Return in about 2 months (around 2024) for back pain; ADD.         SUBJECTIVE/OBJECTIVE:  HPI  Anxiety/Depression/ADD  - controlled on Effexor and Wellbutrin  - controlled on adderall  - was seeing therapist (retired); currently working with insurance to find a new therapist  - no red flags  Update (24)  - needs adderall refill; tolerated well, no trouble sleeping  - pt has two different doses of adderall because her work schedule changes frequently; at times she does not even need to take adderall  - her 30 day refills usually last 1.5-2 months    LBP/DDD/lumbar arthropathy  - controlled on tylenol and mobic  - hx of disc herniation s/p surgery; still with pain  - treated by pain management; could not tolerate opioids  Update (24)  - pt attributes increased back pain to large/heavy breast;

## 2024-04-17 NOTE — PROGRESS NOTES
Brigid Livingston is a 43 y.o. year old female who presents today for   Chief Complaint   Patient presents with    Other     Adderal discussion/refill       Is someone accompanying this pt? no    Is the patient using any DME equipment during OV? no    Depression Screenin/9/2024     9:18 AM   PHQ-9 Questionaire   Little interest or pleasure in doing things 3   Feeling down, depressed, or hopeless 3   Trouble falling or staying asleep, or sleeping too much 3   Feeling tired or having little energy 3   Poor appetite or overeating 0   Feeling bad about yourself - or that you are a failure or have let yourself or your family down 0   Trouble concentrating on things, such as reading the newspaper or watching television 3   Moving or speaking so slowly that other people could have noticed. Or the opposite - being so fidgety or restless that you have been moving around a lot more than usual 0   Thoughts that you would be better off dead, or of hurting yourself in some way 0   PHQ-9 Total Score 15   If you checked off any problems, how difficult have these problems made it for you to do your work, take care of things at home, or get along with other people? 2       Abuse Screenin/9/2024     9:00 AM   AMB Abuse Screening   Do you ever feel afraid of your partner? N   Are you in a relationship with someone who physically or mentally threatens you? N   Is it safe for you to go home? Y       Learning Assessment:  No question data found.    Fall Risk:       No data to display                    Coordination of Care:   1. \"Have you been to the ER, urgent care clinic since your last visit?  Hospitalized since your last visit?\" no    2. \"Have you seen or consulted any other health care providers outside of the [facility] since your last visit?\" no    3. For patients aged 45-75: Has the patient had a colonoscopy / FIT/ Cologuard? due    If the patient is female:    4. For patients aged 40-74: Has the

## 2024-04-17 NOTE — ASSESSMENT & PLAN NOTE
- Likely contributing to low back pain and shoulder pain  -Past mammogram showed dense breast tissue, so weight loss would likely not reduce breast burden   -Physical therapy and anti-inflammatories for now

## 2024-04-29 DIAGNOSIS — F98.8 ATTENTION DEFICIT DISORDER (ADD) WITHOUT HYPERACTIVITY: Primary | ICD-10-CM

## 2024-04-29 RX ORDER — DEXTROAMPHETAMINE SACCHARATE, AMPHETAMINE ASPARTATE MONOHYDRATE, DEXTROAMPHETAMINE SULFATE AND AMPHETAMINE SULFATE 6.25; 6.25; 6.25; 6.25 MG/1; MG/1; MG/1; MG/1
25 CAPSULE, EXTENDED RELEASE ORAL DAILY
Qty: 30 CAPSULE | Refills: 0 | Status: SHIPPED | OUTPATIENT
Start: 2024-06-28 | End: 2024-07-28

## 2024-05-03 DIAGNOSIS — F32.A DEPRESSION, UNSPECIFIED DEPRESSION TYPE: Primary | ICD-10-CM

## 2024-05-03 RX ORDER — BUPROPION HYDROCHLORIDE 100 MG/1
100 TABLET ORAL 2 TIMES DAILY
Qty: 180 TABLET | Refills: 3 | Status: SHIPPED | OUTPATIENT
Start: 2024-05-03

## 2024-06-04 DIAGNOSIS — Z78.0 MENOPAUSE: ICD-10-CM

## 2024-06-04 DIAGNOSIS — E55.9 VITAMIN D DEFICIENCY: ICD-10-CM

## 2024-06-04 DIAGNOSIS — J45.20 MILD INTERMITTENT ASTHMA WITHOUT COMPLICATION: ICD-10-CM

## 2024-06-04 DIAGNOSIS — J45.50 SEVERE PERSISTENT ASTHMA WITHOUT COMPLICATION: ICD-10-CM

## 2024-06-04 DIAGNOSIS — F32.A DEPRESSION, UNSPECIFIED DEPRESSION TYPE: ICD-10-CM

## 2024-06-04 DIAGNOSIS — F98.8 ATTENTION DEFICIT DISORDER (ADD) WITHOUT HYPERACTIVITY: ICD-10-CM

## 2024-06-04 RX ORDER — ERGOCALCIFEROL 1.25 MG/1
50000 CAPSULE ORAL WEEKLY
Qty: 12 CAPSULE | Refills: 1 | Status: SHIPPED | OUTPATIENT
Start: 2024-06-04

## 2024-06-04 RX ORDER — ALBUTEROL SULFATE 90 UG/1
2 AEROSOL, METERED RESPIRATORY (INHALATION) EVERY 6 HOURS PRN
Qty: 18 G | Refills: 5 | Status: SHIPPED | OUTPATIENT
Start: 2024-06-04

## 2024-06-04 RX ORDER — DEXTROAMPHETAMINE SACCHARATE, AMPHETAMINE ASPARTATE MONOHYDRATE, DEXTROAMPHETAMINE SULFATE AND AMPHETAMINE SULFATE 6.25; 6.25; 6.25; 6.25 MG/1; MG/1; MG/1; MG/1
25 CAPSULE, EXTENDED RELEASE ORAL DAILY
Qty: 30 CAPSULE | Refills: 0 | Status: SHIPPED | OUTPATIENT
Start: 2024-06-28 | End: 2024-07-28

## 2024-06-04 RX ORDER — MELOXICAM 15 MG/1
15 TABLET ORAL DAILY
Qty: 90 TABLET | Refills: 3 | Status: SHIPPED | OUTPATIENT
Start: 2024-06-04

## 2024-06-04 RX ORDER — ZOLPIDEM TARTRATE 10 MG/1
10 TABLET ORAL NIGHTLY PRN
Qty: 90 TABLET | Refills: 3 | Status: SHIPPED | OUTPATIENT
Start: 2024-06-04 | End: 2025-05-30

## 2024-06-04 RX ORDER — VENLAFAXINE HYDROCHLORIDE 37.5 MG/1
37.5 CAPSULE, EXTENDED RELEASE ORAL 2 TIMES DAILY
Qty: 180 CAPSULE | Refills: 3 | Status: SHIPPED | OUTPATIENT
Start: 2024-06-04

## 2024-06-04 RX ORDER — BUPROPION HYDROCHLORIDE 100 MG/1
100 TABLET ORAL 2 TIMES DAILY
Qty: 180 TABLET | Refills: 3 | Status: SHIPPED | OUTPATIENT
Start: 2024-06-04

## 2024-06-04 RX ORDER — FLUTICASONE PROPIONATE 50 MCG
2 SPRAY, SUSPENSION (ML) NASAL DAILY
Qty: 1 EACH | Refills: 5 | Status: SHIPPED | OUTPATIENT
Start: 2024-06-04

## 2024-06-04 RX ORDER — IPRATROPIUM BROMIDE AND ALBUTEROL SULFATE 2.5; .5 MG/3ML; MG/3ML
3 SOLUTION RESPIRATORY (INHALATION) EVERY 6 HOURS PRN
Qty: 360 ML | Refills: 5 | OUTPATIENT
Start: 2024-06-04

## 2024-06-04 NOTE — TELEPHONE ENCOUNTER
----- Message from Meche Livingston sent at 5/23/2024  5:50 PM EDT -----  Regarding: Prescription help  Contact: [phone]  Good evening, I had sent a message over but not sure if you had received it.     The [facility] I had been going to closed and sent my info to a [facility]. My insurance does not accept [facility]. There is a long wait for the info to be transferred as the [facility] is completely backed up and at this moment not responding.     The pharmacist at the [facility] said my primary care physician could either send in or call in my prescriptions to them and that would be a better option because they aren’t sure when or if they will receive the transfer of prescriptions.    The [facility] in which I would need them sent to is  [address] [phone].   The prescriptions i would need are  Adderall both 25mg and 10mg, Welbutrin, Effexor, Ambien, Albuterol, Vitamin D, Flonase, meloxicam, flexeril.        Thank you in advance for any help with this mess.                        Meche Livingston

## 2024-06-19 ENCOUNTER — OFFICE VISIT (OUTPATIENT)
Facility: CLINIC | Age: 44
End: 2024-06-19
Payer: COMMERCIAL

## 2024-06-19 VITALS
HEIGHT: 65 IN | HEART RATE: 67 BPM | BODY MASS INDEX: 30.96 KG/M2 | DIASTOLIC BLOOD PRESSURE: 78 MMHG | RESPIRATION RATE: 18 BRPM | SYSTOLIC BLOOD PRESSURE: 113 MMHG | OXYGEN SATURATION: 97 % | WEIGHT: 185.8 LBS | TEMPERATURE: 97.6 F

## 2024-06-19 DIAGNOSIS — F32.A ANXIETY AND DEPRESSION: Primary | ICD-10-CM

## 2024-06-19 DIAGNOSIS — Z12.4 CERVICAL CANCER SCREENING: ICD-10-CM

## 2024-06-19 DIAGNOSIS — F98.8 ATTENTION DEFICIT DISORDER (ADD) WITHOUT HYPERACTIVITY: ICD-10-CM

## 2024-06-19 DIAGNOSIS — S92.214S CLOSED NONDISPLACED FRACTURE OF CUBOID OF RIGHT FOOT, SEQUELA: ICD-10-CM

## 2024-06-19 DIAGNOSIS — Z78.0 MENOPAUSE: ICD-10-CM

## 2024-06-19 DIAGNOSIS — F41.9 ANXIETY AND DEPRESSION: Primary | ICD-10-CM

## 2024-06-19 DIAGNOSIS — M51.36 DDD (DEGENERATIVE DISC DISEASE), LUMBAR: ICD-10-CM

## 2024-06-19 PROCEDURE — 99214 OFFICE O/P EST MOD 30 MIN: CPT | Performed by: STUDENT IN AN ORGANIZED HEALTH CARE EDUCATION/TRAINING PROGRAM

## 2024-06-19 PROCEDURE — G2211 COMPLEX E/M VISIT ADD ON: HCPCS | Performed by: STUDENT IN AN ORGANIZED HEALTH CARE EDUCATION/TRAINING PROGRAM

## 2024-06-19 SDOH — ECONOMIC STABILITY: FOOD INSECURITY: WITHIN THE PAST 12 MONTHS, YOU WORRIED THAT YOUR FOOD WOULD RUN OUT BEFORE YOU GOT MONEY TO BUY MORE.: NEVER TRUE

## 2024-06-19 SDOH — ECONOMIC STABILITY: INCOME INSECURITY: HOW HARD IS IT FOR YOU TO PAY FOR THE VERY BASICS LIKE FOOD, HOUSING, MEDICAL CARE, AND HEATING?: NOT HARD AT ALL

## 2024-06-19 SDOH — ECONOMIC STABILITY: FOOD INSECURITY: WITHIN THE PAST 12 MONTHS, THE FOOD YOU BOUGHT JUST DIDN'T LAST AND YOU DIDN'T HAVE MONEY TO GET MORE.: NEVER TRUE

## 2024-06-19 ASSESSMENT — ANXIETY QUESTIONNAIRES
GAD7 TOTAL SCORE: 0
2. NOT BEING ABLE TO STOP OR CONTROL WORRYING: NOT AT ALL
IF YOU CHECKED OFF ANY PROBLEMS ON THIS QUESTIONNAIRE, HOW DIFFICULT HAVE THESE PROBLEMS MADE IT FOR YOU TO DO YOUR WORK, TAKE CARE OF THINGS AT HOME, OR GET ALONG WITH OTHER PEOPLE: NOT DIFFICULT AT ALL
4. TROUBLE RELAXING: NOT AT ALL
3. WORRYING TOO MUCH ABOUT DIFFERENT THINGS: NOT AT ALL
1. FEELING NERVOUS, ANXIOUS, OR ON EDGE: NOT AT ALL
5. BEING SO RESTLESS THAT IT IS HARD TO SIT STILL: NOT AT ALL
7. FEELING AFRAID AS IF SOMETHING AWFUL MIGHT HAPPEN: NOT AT ALL

## 2024-06-19 ASSESSMENT — PATIENT HEALTH QUESTIONNAIRE - PHQ9
SUM OF ALL RESPONSES TO PHQ QUESTIONS 1-9: 0
9. THOUGHTS THAT YOU WOULD BE BETTER OFF DEAD, OR OF HURTING YOURSELF: NOT AT ALL
4. FEELING TIRED OR HAVING LITTLE ENERGY: NOT AT ALL
10. IF YOU CHECKED OFF ANY PROBLEMS, HOW DIFFICULT HAVE THESE PROBLEMS MADE IT FOR YOU TO DO YOUR WORK, TAKE CARE OF THINGS AT HOME, OR GET ALONG WITH OTHER PEOPLE: NOT DIFFICULT AT ALL
8. MOVING OR SPEAKING SO SLOWLY THAT OTHER PEOPLE COULD HAVE NOTICED. OR THE OPPOSITE, BEING SO FIGETY OR RESTLESS THAT YOU HAVE BEEN MOVING AROUND A LOT MORE THAN USUAL: NOT AT ALL
1. LITTLE INTEREST OR PLEASURE IN DOING THINGS: NOT AT ALL
5. POOR APPETITE OR OVEREATING: NOT AT ALL
7. TROUBLE CONCENTRATING ON THINGS, SUCH AS READING THE NEWSPAPER OR WATCHING TELEVISION: NOT AT ALL
6. FEELING BAD ABOUT YOURSELF - OR THAT YOU ARE A FAILURE OR HAVE LET YOURSELF OR YOUR FAMILY DOWN: NOT AT ALL
SUM OF ALL RESPONSES TO PHQ9 QUESTIONS 1 & 2: 0
2. FEELING DOWN, DEPRESSED OR HOPELESS: NOT AT ALL
SUM OF ALL RESPONSES TO PHQ QUESTIONS 1-9: 0
3. TROUBLE FALLING OR STAYING ASLEEP: NOT AT ALL

## 2024-06-19 NOTE — PROGRESS NOTES
Meche Livingston (:  1980) is a 44 y.o. female here for evaluation of the following chief complaint(s):  Follow-up and Follow-Up from Hospital        ASSESSMENT/PLAN:  1. Anxiety and depression  Assessment & Plan:  - controlled; pt still looking for psych   2. Cervical cancer screening  -     External Referral To Gynecology  3. Menopause  Assessment & Plan:  - bloating is bothersome for her  - gyn referral for menopause management   Orders:  -     External Referral To Gynecology  4. Attention deficit disorder (ADD) without hyperactivity  Assessment & Plan:  - controlled; pt still looking for psych    5. DDD (degenerative disc disease), lumbar  Assessment & Plan:  - to make PT appt  - if no improvement with conservative therapy will consider specialists (spine vs breast)   6. Closed nondisplaced fracture of cuboid of right foot, sequela  Assessment & Plan:  - appears to be improving  - fu with ortho       Follow-up and Dispositions    Return for keep scheduled appointment.         SUBJECTIVE/OBJECTIVE:  HPI  Anxiety/Depression/ADD  - controlled on Effexor and Wellbutrin  - controlled on adderall  - was seeing therapist (retired); currently working with insurance to find a new therapist  - no red flags  Update (24)  - needs adderall refill; tolerated well, no trouble sleeping  - pt has two different doses of adderall because her work schedule changes frequently; at times she does not even need to take adderall  - her 30 day refills usually last 1.5-2 months  Update (24)  - tolerating meds well  - still looking for psych with insurance     LBP/DDD/lumbar arthropathy  - controlled on tylenol and mobic  - hx of disc herniation s/p surgery; still with pain  - treated by pain management; could not tolerate opioids  Update (24)  - pt attributes increased back pain to large/heavy breast; also with shoulder pain from bra straps  - interested in PT vs breast reduction  Update (24)  - trouble

## 2024-06-19 NOTE — PROGRESS NOTES
Meche Livingston is a 44 y.o. presents today for No chief complaint on file.    Is someone accompanying this pt? yes,     Is the patient using any DME equipment during OV? no  There were no vitals filed for this visit.    Depression Screenin/19/2024     9:09 AM 2024     9:18 AM   PHQ-9 Questionaire   Little interest or pleasure in doing things 0 3   Feeling down, depressed, or hopeless 0 3   Trouble falling or staying asleep, or sleeping too much 0 3   Feeling tired or having little energy 0 3   Poor appetite or overeating 0 0   Feeling bad about yourself - or that you are a failure or have let yourself or your family down 0 0   Trouble concentrating on things, such as reading the newspaper or watching television 0 3   Moving or speaking so slowly that other people could have noticed. Or the opposite - being so fidgety or restless that you have been moving around a lot more than usual 0 0   Thoughts that you would be better off dead, or of hurting yourself in some way 0 0   PHQ-9 Total Score 0 15   If you checked off any problems, how difficult have these problems made it for you to do your work, take care of things at home, or get along with other people? 0 2        Abuse Screenin/9/2024     9:00 AM   AMB Abuse Screening   Do you ever feel afraid of your partner? N   Are you in a relationship with someone who physically or mentally threatens you? N   Is it safe for you to go home? Y        Learning Assessment Screening:   No question data found.     Fall Risk Screening:        No data to display                    Health Maintenance: reviewed and discussed and ordered per Provider.    Health Maintenance Due   Topic Date Due    Hepatitis B vaccine (1 of 3 - 3-dose series) Never done    COVID-19 Vaccine (1) Never done    Varicella vaccine (1 of 2 - 2-dose childhood series) Never done         Coordination of Care:   1. \"Have you been to the ER, urgent care clinic since your last visit?  Hospitalized

## 2024-06-20 PROBLEM — S92.214A CLOSED NONDISPLACED FRACTURE OF CUBOID BONE OF RIGHT FOOT: Status: ACTIVE | Noted: 2024-06-20

## 2024-06-20 NOTE — ASSESSMENT & PLAN NOTE
- to make PT appt  - if no improvement with conservative therapy will consider specialists (spine vs breast)

## 2024-07-24 ENCOUNTER — HOSPITAL ENCOUNTER (OUTPATIENT)
Facility: HOSPITAL | Age: 44
Setting detail: RECURRING SERIES
Discharge: HOME | End: 2024-07-27
Payer: COMMERCIAL

## 2024-07-24 PROCEDURE — 97162 PT EVAL MOD COMPLEX 30 MIN: CPT

## 2024-07-24 NOTE — THERAPY EVALUATION
[facility]  [address], [address], [place] [address] Ph:[phone] Fx: [phone]  Plan of Care / Statement of Necessity for Physical Therapy Services     Patient Name: Meche Livingston : 1980   Medical   Diagnosis: Lumbago with sciatica, right side [M54.41]  Lumbago with sciatica, left side [M54.42]  Chronic lower back pain [M54.50, G89.29] Treatment Diagnosis: M54.6,G89.29  CHRONIC THORACIC BACK PAIN and M54.59, G89.29  CHRONIC LOWER BACK PAIN    Onset Date: Chronic, with recent flare-up, 2024     Referral Source: Enzo Thompson,* Start of Care (SOC): 2024   Prior Hospitalization: See medical history Provider #: [id number]   Prior Level of Function: Around April she was feeling better and more functional, then she broke her foot   Comorbidities:  Respiratory disorders, Musculoskeletal disorders, Social determinants of health: Depression, and Other: arthritis, asthma   Subjective: Reports since she was little she's had larger breasts and lots of pressure. She is not seeking breast reduction surgery, however she doesn't want to wait until she is 61 to have it. Has arthritis and back pain. She gave birth in , then hysterectomy, and a discectomy () with no PT but \"a bucket of meds.\" Reports pain down both legs. Recent right foot fracture and in a boot now. \"The pain has always been like this.\"  Denies taking pain meds at the moment. She doesn't take the Meloxicam she has. She used to take narcotics in the past. Hx of pain management. 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 She can take the dogs for a walk.   Low back is the worst, then the neck and shoulders, then the leg pain, in that order.     Assessment / key information:  Pt presents to [facility] at [facility] with signs and symptoms congruent with a diagnosis of back pain, myofascial pain and guarding R>L. LE pain was not reproduced today in

## 2024-07-24 NOTE — PROGRESS NOTES
abnormalities to address functional deficits and attain remaining goals.      Progress toward goals / Updated goals:  []  See Progress Note/Recertification    SEE EVAL    Next PN / RC due: 8/24/24  Auth due: ?    PLAN  YES Continue plan of care  [x]  Upgrade activities as tolerated  []  Discharge due to :  []  Other:    Demetri Donaldson PT DPT, OCS, Cert-DN   7/24/2024    7:52 AM    *If an interpreting service was utilized for treatment of this patient, the contents of this document represent the material reviewed with the patient via the .     Future Appointments   Date Time Provider Department Center   7/24/2024 11:40 AM Demetri Donaldson, PT [facility]   8/16/2024  9:00 AM Enzo Thompson MD [facility]

## 2024-08-12 ENCOUNTER — HOSPITAL ENCOUNTER (OUTPATIENT)
Facility: HOSPITAL | Age: 44
Setting detail: RECURRING SERIES
Discharge: HOME | End: 2024-08-15
Payer: COMMERCIAL

## 2024-08-12 PROCEDURE — 97535 SELF CARE MNGMENT TRAINING: CPT

## 2024-08-12 PROCEDURE — 97110 THERAPEUTIC EXERCISES: CPT

## 2024-08-12 PROCEDURE — 97530 THERAPEUTIC ACTIVITIES: CPT

## 2024-08-12 NOTE — PROGRESS NOTES
PHYSICAL / OCCUPATIONAL THERAPY - DAILY TREATMENT NOTE (updated )    Patient Name: Meche Livingston    Date: 2024    : 1980  Insurance: Pay[place]: [employer] [place] / Plan: [employer] [place] / Product Type: *No Product type* /      Patient  verified Yes     Visit #   Current / Total 2 16   Time   In / Out 1145 1230   Pain   In / Out 4/10 010   Subjective Functional Status/Changes: Patient reports she is no longer in boot.  Says that she is gradually transitioning.     Changes to:  Meds, Allergies, Med Hx, Sx Hx?  If yes, update Summary List no       TREATMENT AREA =  Lumbago with sciatica, right side [M54.41]  Lumbago with sciatica, left side [M54.42]  Chronic lower back pain [M54.50, G89.29]    OBJECTIVE         Therapeutic Procedures:    Tx Min Billable or 1:1 Min (if diff from Tx Min) Procedure, Rationale, Specifics   15  16721 Therapeutic Exercise (timed):  increase ROM, strength, coordination, balance, and proprioception to improve patient's ability to progress to PLOF and address remaining functional goals. (see flow sheet as applicable)     Details if applicable:         55703 Neuromuscular Re-Education (timed):  improve balance, coordination, kinesthetic sense, posture, core stability and proprioception to improve patient's ability to develop conscious control of individual muscles and awareness of position of extremities in order to progress to PLOF and address remaining functional goals. (see flow sheet as applicable)     Details if applicable:       61455 Manual Therapy (timed):  decrease pain, increase ROM, increase tissue extensibility, decrease edema, correct positional vertigo, decrease trigger points, and increase postural awareness to improve patient's ability to progress to PLOF and address remaining functional goals.  The manual therapy interventions were performed at a separate and distinct time from the therapeutic activities interventions . (see flow sheet as

## 2024-08-14 ENCOUNTER — HOSPITAL ENCOUNTER (OUTPATIENT)
Facility: HOSPITAL | Age: 44
Setting detail: RECURRING SERIES
Discharge: HOME | End: 2024-08-17
Payer: COMMERCIAL

## 2024-08-14 PROCEDURE — 97530 THERAPEUTIC ACTIVITIES: CPT

## 2024-08-14 PROCEDURE — 97112 NEUROMUSCULAR REEDUCATION: CPT

## 2024-08-14 PROCEDURE — 97110 THERAPEUTIC EXERCISES: CPT

## 2024-08-14 NOTE — PROGRESS NOTES
PHYSICAL / OCCUPATIONAL THERAPY - DAILY TREATMENT NOTE (updated )    Patient Name: Meche Livingston    Date: 2024    : 1980  Insurance: [place] [employer] [place] / Plan: [employer] [place] / Product Type: *No Product type* /      Patient  verified Yes     Visit #   Current / Total 3 16   Time   In / Out 1025 1100   Pain   In / Out 3-4 /10   Subjective Functional Status/Changes: Patient reports no pain post last visit.  Says that she feels more comfortable with her HEP now.   Changes to:  Meds, Allergies, Med Hx, Sx Hx?  If yes, update Summary List no       TREATMENT AREA =  Lumbago with sciatica, right side [M54.41]  Lumbago with sciatica, left side [M54.42]  Chronic lower back pain [M54.50, G89.29]    OBJECTIVE         Therapeutic Procedures:    Tx Min Billable or 1:1 Min (if diff from Tx Min) Procedure, Rationale, Specifics   12  66158 Therapeutic Exercise (timed):  increase ROM, strength, coordination, balance, and proprioception to improve patient's ability to progress to PLOF and address remaining functional goals. (see flow sheet as applicable)     Details if applicable:       8  80945 Neuromuscular Re-Education (timed):  improve balance, coordination, kinesthetic sense, posture, core stability and proprioception to improve patient's ability to develop conscious control of individual muscles and awareness of position of extremities in order to progress to PLOF and address remaining functional goals. (see flow sheet as applicable)     Details if applicable:       92288 Manual Therapy (timed):  decrease pain, increase ROM, increase tissue extensibility, decrease edema, correct positional vertigo, decrease trigger points, and increase postural awareness to improve patient's ability to progress to PLOF and address remaining functional goals.  The manual therapy interventions were performed at a separate and distinct time from the therapeutic activities interventions . (see flow

## 2024-08-19 ENCOUNTER — TELEPHONE (OUTPATIENT)
Facility: HOSPITAL | Age: 44
End: 2024-08-19

## 2024-08-19 ENCOUNTER — HOSPITAL ENCOUNTER (OUTPATIENT)
Facility: HOSPITAL | Age: 44
Setting detail: RECURRING SERIES
Discharge: HOME | End: 2024-08-22
Payer: COMMERCIAL

## 2024-08-19 PROCEDURE — 97112 NEUROMUSCULAR REEDUCATION: CPT

## 2024-08-19 PROCEDURE — 97530 THERAPEUTIC ACTIVITIES: CPT

## 2024-08-19 PROCEDURE — 97110 THERAPEUTIC EXERCISES: CPT

## 2024-08-19 NOTE — PROGRESS NOTES
time from the therapeutic activities interventions . (see flow sheet as applicable)     Details if applicable:  STM: R groin region, R QL and t/s, l/s paraspinals   10  97405 Therapeutic Activity (timed):  use of dynamic activities replicating functional movements to increase ROM, strength, coordination, balance, and proprioception in order to improve patient's ability to progress to PLOF and address remaining functional goals.  (see flow sheet as applicable)     Details if applicable:       75387 Self Care/Home Management (timed):  improve patient knowledge and understanding of pain reducing techniques, positioning, posture/ergonomics, home safety, activity modification, diagnosis/prognosis, and physical therapy expectations, procedures and progression  to improve patient's ability to progress to PLOF and address remaining functional goals.  (see flow sheet as applicable)     Details if applicable:  Discussed sleep and sitting posture - reviewed and demo'd HEP.   37  [facility] Totals Reminder: bill using total billable min of TIMED therapeutic procedures (example: do not include dry needle or estim unattended, both untimed codes, in totals to left)  8-22 min = 1 unit; 23-37 min = 2 units; 38-52 min = 3 units; 53-67 min = 4 units; 68-82 min = 5 units   Total Total     [x]  Patient Education billed concurrently with other procedures   [x] Review HEP    [] Progressed/Changed HEP, detail:    [] Other detail:       Objective Information/Functional Measures/Assessment    Worked on functional strengthening and mobility today.  Reviewed HEP for form.  Discussed use of home modalities such as LAX ball.  Pt responded moderately to MT.       Patient will continue to benefit from skilled PT / OT services to modify and progress therapeutic interventions, analyze and address functional mobility deficits, analyze and address ROM deficits, analyze and address strength deficits, analyze and address soft tissue restrictions, analyze and

## 2024-08-21 ENCOUNTER — HOSPITAL ENCOUNTER (OUTPATIENT)
Facility: HOSPITAL | Age: 44
Setting detail: SPECIMEN
Discharge: HOME | End: 2024-08-24
Payer: COMMERCIAL

## 2024-08-21 ENCOUNTER — PATIENT MESSAGE (OUTPATIENT)
Facility: CLINIC | Age: 44
End: 2024-08-21

## 2024-08-21 ENCOUNTER — APPOINTMENT (OUTPATIENT)
Facility: HOSPITAL | Age: 44
End: 2024-08-21
Payer: COMMERCIAL

## 2024-08-21 ENCOUNTER — OFFICE VISIT (OUTPATIENT)
Facility: CLINIC | Age: 44
End: 2024-08-21
Payer: COMMERCIAL

## 2024-08-21 VITALS
SYSTOLIC BLOOD PRESSURE: 114 MMHG | HEART RATE: 68 BPM | TEMPERATURE: 98.3 F | BODY MASS INDEX: 30.79 KG/M2 | RESPIRATION RATE: 12 BRPM | WEIGHT: 184.8 LBS | HEIGHT: 65 IN | OXYGEN SATURATION: 97 % | DIASTOLIC BLOOD PRESSURE: 77 MMHG

## 2024-08-21 DIAGNOSIS — Z12.31 ENCOUNTER FOR SCREENING MAMMOGRAM FOR BREAST CANCER: Primary | ICD-10-CM

## 2024-08-21 DIAGNOSIS — N62 MACROMASTIA: ICD-10-CM

## 2024-08-21 DIAGNOSIS — G89.29 CHRONIC BILATERAL LOW BACK PAIN WITHOUT SCIATICA: ICD-10-CM

## 2024-08-21 DIAGNOSIS — M84.474S: ICD-10-CM

## 2024-08-21 DIAGNOSIS — E55.9 VITAMIN D DEFICIENCY: ICD-10-CM

## 2024-08-21 DIAGNOSIS — F41.1 GAD (GENERALIZED ANXIETY DISORDER): Primary | ICD-10-CM

## 2024-08-21 DIAGNOSIS — M54.50 CHRONIC BILATERAL LOW BACK PAIN WITHOUT SCIATICA: ICD-10-CM

## 2024-08-21 DIAGNOSIS — Z13.820 SCREENING FOR OSTEOPOROSIS: ICD-10-CM

## 2024-08-21 DIAGNOSIS — Z12.31 SCREENING MAMMOGRAM FOR BREAST CANCER: ICD-10-CM

## 2024-08-21 PROBLEM — M84.474A: Status: ACTIVE | Noted: 2024-08-21

## 2024-08-21 LAB — 25(OH)D3 SERPL-MCNC: 40.6 NG/ML (ref 30–100)

## 2024-08-21 PROCEDURE — 82306 VITAMIN D 25 HYDROXY: CPT

## 2024-08-21 PROCEDURE — 99214 OFFICE O/P EST MOD 30 MIN: CPT | Performed by: STUDENT IN AN ORGANIZED HEALTH CARE EDUCATION/TRAINING PROGRAM

## 2024-08-21 PROCEDURE — 36415 COLL VENOUS BLD VENIPUNCTURE: CPT

## 2024-08-21 PROCEDURE — G2211 COMPLEX E/M VISIT ADD ON: HCPCS | Performed by: STUDENT IN AN ORGANIZED HEALTH CARE EDUCATION/TRAINING PROGRAM

## 2024-08-21 RX ORDER — VENLAFAXINE HYDROCHLORIDE 75 MG/1
75 CAPSULE, EXTENDED RELEASE ORAL DAILY
Qty: 90 CAPSULE | Refills: 3 | Status: SHIPPED | OUTPATIENT
Start: 2024-08-21

## 2024-08-21 RX ORDER — VENLAFAXINE HYDROCHLORIDE 75 MG/1
75 CAPSULE, EXTENDED RELEASE ORAL DAILY
Qty: 90 CAPSULE | Refills: 3 | Status: SHIPPED | OUTPATIENT
Start: 2024-08-21 | End: 2024-08-21 | Stop reason: SDUPTHER

## 2024-08-21 NOTE — PROGRESS NOTES
Meche Livingston (:  1980) is a 44 y.o. female here for evaluation of the following chief complaint(s):  Follow-up        ASSESSMENT/PLAN:  1. GRACIELA (generalized anxiety disorder)  -     venlafaxine (EFFEXOR XR) 75 MG extended release capsule; Take 1 capsule by mouth daily, Disp-90 capsule, R-3Normal  2. Screening for osteoporosis  -     DEXA BONE DENSITY AXIAL SKELETON; Future  3. Pathological fracture of right foot, unspecified pathological cause, sequela  Assessment & Plan:  - concern for osteoporosis, given pathologic fracture in the setting of vitamin D def, menopause sxs, and strong family hx  - DEXA   Orders:  -     DEXA BONE DENSITY AXIAL SKELETON; Future  4. Screening mammogram for breast cancer  -     DEA DIGITAL SCREEN W OR WO CAD BILATERAL; Future  5. Vitamin D deficiency  Assessment & Plan:  - check levels today  - if WNL, start daily vit D with at least 1000 units   Orders:  -     Vitamin D 25 Hydroxy; Future  6. Macromastia  Assessment & Plan:  - continue with PT for back pain   7. Chronic bilateral low back pain without sciatica  Assessment & Plan:  - improving with PT, continue tx       Follow-up and Dispositions    Return in about 6 months (around 2025) for check up.         SUBJECTIVE/OBJECTIVE:  HPI  - breast US in   - needs PCV for vaping?  - needs boostrix    Anxiety/Depression/ADD  - controlled on Effexor and Wellbutrin  - controlled on adderall  - was seeing therapist (retired); currently working with insurance to find a new therapist  - no red flags  Update (24)  - needs adderall refill; tolerated well, no trouble sleeping  - pt has two different doses of adderall because her work schedule changes frequently; at times she does not even need to take adderall  - her 30 day refills usually last 1.5-2 months  Update (24)  - tolerating meds well  - still looking for psych with insurance  Update (24)  - missed a few doses, experienced withdrawal; better

## 2024-08-21 NOTE — PATIENT INSTRUCTIONS
If your vitamin D levels are normal, you just need to take a daily multivitamin with at least 1000 untis of vitamin D.    To help prevent dementia: stay active physically and mentally; limit alcohol and maintain a healthy diet.

## 2024-08-21 NOTE — ASSESSMENT & PLAN NOTE
- concern for osteoporosis, given pathologic fracture in the setting of vitamin D def, menopause sxs, and strong family hx  - DEXA

## 2024-08-21 NOTE — PROGRESS NOTES
Meche Livingston is a 44 y.o. year old female who presents today for   Chief Complaint   Patient presents with    Follow-up       Is someone accompanying this pt? No     Is the patient using any DME equipment during OV? No     Depression Screenin/19/2024     9:09 AM 2024     9:18 AM   PHQ-9 Questionaire   Little interest or pleasure in doing things 0 3   Feeling down, depressed, or hopeless 0 3   Trouble falling or staying asleep, or sleeping too much 0 3   Feeling tired or having little energy 0 3   Poor appetite or overeating 0 0   Feeling bad about yourself - or that you are a failure or have let yourself or your family down 0 0   Trouble concentrating on things, such as reading the newspaper or watching television 0 3   Moving or speaking so slowly that other people could have noticed. Or the opposite - being so fidgety or restless that you have been moving around a lot more than usual 0 0   Thoughts that you would be better off dead, or of hurting yourself in some way 0 0   PHQ-9 Total Score 0 15   If you checked off any problems, how difficult have these problems made it for you to do your work, take care of things at home, or get along with other people? 0 2       Abuse Screenin/9/2024     9:00 AM   AMB Abuse Screening   Do you ever feel afraid of your partner? N   Are you in a relationship with someone who physically or mentally threatens you? N   Is it safe for you to go home? Y       Learning Assessment:  No question data found.    Fall Risk:       No data to display                    Coordination of Care:   1. \"Have you been to the ER, urgent care clinic since your last visit?  Hospitalized since your last visit?\" No     2. \"Have you seen or consulted any other health care providers outside of the [facility] since your last visit?\" Yes     3. For patients aged 45-75: Has the patient had a colonoscopy / FIT/ Cologuard? Not due     If the patient is female:    4. For

## 2024-08-23 ENCOUNTER — HOSPITAL ENCOUNTER (OUTPATIENT)
Facility: HOSPITAL | Age: 44
Setting detail: RECURRING SERIES
Discharge: HOME | End: 2024-08-26
Payer: COMMERCIAL

## 2024-08-23 PROCEDURE — 97535 SELF CARE MNGMENT TRAINING: CPT

## 2024-08-23 PROCEDURE — 97530 THERAPEUTIC ACTIVITIES: CPT

## 2024-08-23 PROCEDURE — 97110 THERAPEUTIC EXERCISES: CPT

## 2024-08-23 PROCEDURE — 97112 NEUROMUSCULAR REEDUCATION: CPT

## 2024-08-23 NOTE — PROGRESS NOTES
PHYSICAL / OCCUPATIONAL THERAPY - DAILY TREATMENT NOTE (updated )    Patient Name: Meche Livingston    Date: 2024    : 1980  Insurance: [place] [employer] [place] / Plan: [employer] [place] / Product Type: *No Product type* /      Patient  verified Yes     Visit #   Current / Total 5 16   Time   In / Out 1025 1115   Pain   In / Out 0/10 0/10   Subjective Functional Status/Changes: Patient reports only small discomfort in R low back.  Says that groin is much better.     Changes to:  Meds, Allergies, Med Hx, Sx Hx?  If yes, update Summary List no       TREATMENT AREA =  Lumbago with sciatica, right side [M54.41]  Lumbago with sciatica, left side [M54.42]  Chronic lower back pain [M54.50, G89.29]    OBJECTIVE         Therapeutic Procedures:    Tx Min Billable or 1:1 Min (if diff from Tx Min) Procedure, Rationale, Specifics   20  85234 Therapeutic Exercise (timed):  increase ROM, strength, coordination, balance, and proprioception to improve patient's ability to progress to PLOF and address remaining functional goals. (see flow sheet as applicable)     Details if applicable:       12  13855 Neuromuscular Re-Education (timed):  improve balance, coordination, kinesthetic sense, posture, core stability and proprioception to improve patient's ability to develop conscious control of individual muscles and awareness of position of extremities in order to progress to PLOF and address remaining functional goals. (see flow sheet as applicable)     Details if applicable:       07140 Manual Therapy (timed):  decrease pain, increase ROM, increase tissue extensibility, decrease edema, correct positional vertigo, decrease trigger points, and increase postural awareness to improve patient's ability to progress to PLOF and address remaining functional goals.  The manual therapy interventions were performed at a separate and distinct time from the therapeutic activities interventions . (see flow sheet as

## 2024-08-23 NOTE — PROGRESS NOTES
[facility]  [address],[place] [address] - Ph: [phone]  Fx: [phone]  PHYSICAL THERAPY PROGRESS NOTE  Patient Name: Meche Livingston : 1980   Treatment/Medical Diagnosis: Lumbago with sciatica, right side [M54.41]  Lumbago with sciatica, left side [M54.42]  Chronic lower back pain [M54.50, G89.29]   Referral Source: Enzo Thompson,*     Date of Initial Visit: 24 Attended Visits: 5 Missed Visits: 0     SUMMARY OF TREATMENT  Pt was seen for IE and 4 f/u visits with treatment consisting of therapeutic exercise, therapeutic activity, neuromuscular activity, manual therapy, activity modification/progression, and Pt ED to improve functional, pain free ROM along with instruction of HEP.    CURRENT STATUS  Pt has made moderate progress in PT as demonstrated by decreased max pain levels at 7/10 and average pain level of 2-3/10. Pt report 40% improvement since beginning therapy. Pt also notes an increase in pain, particularly with prolonged standing, bending, and stooping at work for multiple hours. Patient has demonstrated an increase in trunk AROM with no pain reported during assessment.  Pt is very compliant with HEP and has expressed its helpfulness with upper thoracic pain. Pt will continue to benefit from skilled PT to progress exercises and improve upon increased tolerance to functional activities and a reduction in painful ROM.    Trunk AROM:  Flexion: Finger-tips-to-knees - reports only tightness  Extension: WNL - no pain reported  Side Bend (SB): WNL - no pain reported  Rotation: L: 30 deg, R: 35 deg - only tightness reported      Pt to report adherence and demonstrate compliance with basic HEP to allow progress between visits - Patient reporting compliance - Progressing and ongoing  2.  Pt to report pain <5/10 at worst to allow improved function and QOL (10/10 at worst at Eval) - 7/10 reported - No  3. Pt to

## 2024-08-26 ENCOUNTER — HOSPITAL ENCOUNTER (OUTPATIENT)
Facility: HOSPITAL | Age: 44
Setting detail: RECURRING SERIES
End: 2024-08-26
Payer: COMMERCIAL

## 2024-08-26 NOTE — PROGRESS NOTES
PHYSICAL / OCCUPATIONAL THERAPY - DAILY TREATMENT NOTE (updated )    Patient Name: Meche Livingston    Date: 2024    : 1980  Insurance: Payor: [employer] [place] / Plan: [employer] [facility] / Product Type: *No Product type* /      Patient  verified Yes     Visit #   Current / Total 6 16   Time   In / Out 9:40 10:20   Pain   In / Out 0/10 0/10   Subjective Functional Status/Changes: ***     TREATMENT AREA =  Lumbago with sciatica, right side [M54.41]  Lumbago with sciatica, left side [M54.42]  Chronic lower back pain [M54.50, G89.29]    OBJECTIVE    Therapeutic Procedures:    Tx Min Billable or 1:1 Min (if diff from Tx Min) Procedure, Rationale, Specifics   20  36049 Therapeutic Exercise (timed):  increase ROM, strength, coordination, balance, and proprioception to improve patient's ability to progress to PLOF and address remaining functional goals. (see flow sheet as applicable)     Details if applicable:       10  58505 Neuromuscular Re-Education (timed):  improve balance, coordination, kinesthetic sense, posture, core stability and proprioception to improve patient's ability to develop conscious control of individual muscles and awareness of position of extremities in order to progress to PLOF and address remaining functional goals. (see flow sheet as applicable)     Details if applicable:       38866 Manual Therapy (timed):  decrease pain, increase ROM, increase tissue extensibility, decrease edema, correct positional vertigo, decrease trigger points, and increase postural awareness to improve patient's ability to progress to PLOF and address remaining functional goals.  The manual therapy interventions were performed at a separate and distinct time from the therapeutic activities interventions . (see flow sheet as applicable)     Details if applicable:     10  83862 Therapeutic Activity (timed):  use of dynamic activities replicating functional movements to increase ROM, strength,  coordination, balance, and proprioception in order to improve patient's ability to progress to PLOF and address remaining functional goals.  (see flow sheet as applicable)     Details if applicable:       98235 Self Care/Home Management (timed):  improve patient knowledge and understanding of pain reducing techniques, positioning, posture/ergonomics, home safety, activity modification, diagnosis/prognosis, and physical therapy expectations, procedures and progression  to improve patient's ability to progress to PLOF and address remaining functional goals.  (see flow sheet as applicable)     Details if applicable:  discussed returning to a gym, importance of sleep and healing   40  [facility] Totals Reminder: bill using total billable min of TIMED therapeutic procedures (example: do not include dry needle or estim unattended, both untimed codes, in totals to left)  8-22 min = 1 unit; 23-37 min = 2 units; 38-52 min = 3 units; 53-67 min = 4 units; 68-82 min = 5 units   Total Total     [x]  Patient Education billed concurrently with other procedures   [x] Review HEP    [] Progressed/Changed HEP, detail:    [] Other detail:       Objective Information/Functional Measures/Assessment    ***    Patient will continue to benefit from skilled PT / OT services to modify and progress therapeutic interventions, analyze and address functional mobility deficits, analyze and address ROM deficits, analyze and address strength deficits, analyze and address soft tissue restrictions, analyze and cue for proper movement patterns, analyze and modify for postural abnormalities, analyze and address imbalance/dizziness, and instruct in home and community integration to address functional deficits and attain remaining goals.    Progress toward goals / Updated goals:  [x]  See Progress Note/Recertification  Next PN/ RC due 9/23/24  Auth due 16V - 9/23/24    STG:  Pt to report adherence and demonstrate compliance with basic HEP to allow progress

## 2024-08-28 ENCOUNTER — HOSPITAL ENCOUNTER (OUTPATIENT)
Facility: HOSPITAL | Age: 44
Setting detail: RECURRING SERIES
Discharge: HOME | End: 2024-08-31
Payer: COMMERCIAL

## 2024-08-28 PROCEDURE — 97110 THERAPEUTIC EXERCISES: CPT

## 2024-08-28 PROCEDURE — 97530 THERAPEUTIC ACTIVITIES: CPT

## 2024-08-28 PROCEDURE — 97112 NEUROMUSCULAR REEDUCATION: CPT

## 2024-08-28 NOTE — PROGRESS NOTES
PHYSICAL / OCCUPATIONAL THERAPY - DAILY TREATMENT NOTE (updated )    Patient Name: Meche Livingston    Date: 2024    : 1980  Insurance: Pay[place]: [employer] [place] / Plan: [employer] [facility] / Product Type: *No Product type* /      Patient  verified Yes     Visit #   Current / Total 6 16   Time   In / Out 1020 1100   Pain   In / Out 0/10 0/10   Subjective Functional Status/Changes: Patient reports she is a little tired today with new schedule and kids back in school.  Says that she is implementing some gym like exercises at home, but has yet to get back to the gym.  Says that she was very \"good\" sore post last session.   Changes to:  Meds, Allergies, Med Hx, Sx Hx?  If yes, update Summary List no       TREATMENT AREA =  Lumbago with sciatica, right side [M54.41]  Lumbago with sciatica, left side [M54.42]  Chronic lower back pain [M54.50, G89.29]    OBJECTIVE         Therapeutic Procedures:    Tx Min Billable or 1:1 Min (if diff from Tx Min) Procedure, Rationale, Specifics   20  03761 Therapeutic Exercise (timed):  increase ROM, strength, coordination, balance, and proprioception to improve patient's ability to progress to PLOF and address remaining functional goals. (see flow sheet as applicable)     Details if applicable:       12  25202 Neuromuscular Re-Education (timed):  improve balance, coordination, kinesthetic sense, posture, core stability and proprioception to improve patient's ability to develop conscious control of individual muscles and awareness of position of extremities in order to progress to PLOF and address remaining functional goals. (see flow sheet as applicable)     Details if applicable:       46872 Manual Therapy (timed):  decrease pain, increase ROM, increase tissue extensibility, decrease edema, correct positional vertigo, decrease trigger points, and increase postural awareness to improve patient's ability to progress to PLOF and address remaining functional  goals.  The manual therapy interventions were performed at a separate and distinct time from the therapeutic activities interventions . (see flow sheet as applicable)     Details if applicable:     8  47294 Therapeutic Activity (timed):  use of dynamic activities replicating functional movements to increase ROM, strength, coordination, balance, and proprioception in order to improve patient's ability to progress to PLOF and address remaining functional goals.  (see flow sheet as applicable)     Details if applicable:       59166 Self Care/Home Management (timed):  improve patient knowledge and understanding of pain reducing techniques, positioning, posture/ergonomics, home safety, activity modification, diagnosis/prognosis, and physical therapy expectations, procedures and progression  to improve patient's ability to progress to PLOF and address remaining functional goals.  (see flow sheet as applicable)     Details if applicable:  discussed returning to a gym, importance of sleep and healing   40  [facility] Totals Reminder: bill using total billable min of TIMED therapeutic procedures (example: do not include dry needle or estim unattended, both untimed codes, in totals to left)  8-22 min = 1 unit; 23-37 min = 2 units; 38-52 min = 3 units; 53-67 min = 4 units; 68-82 min = 5 units   Total Total     [x]  Patient Education billed concurrently with other procedures   [x] Review HEP    [] Progressed/Changed HEP, detail:    [] Other detail:       Objective Information/Functional Measures/Assessment    Good functional strengthening session this visit.  Introduced gym\"like\" exercises for ease of transition back to the gym.  Patient performed well and responded favorably to the treatment - no residual effects noted.    Patient will continue to benefit from skilled PT / OT services to modify and progress therapeutic interventions, analyze and address functional mobility deficits, analyze and address ROM deficits, analyze and

## 2024-09-04 ENCOUNTER — APPOINTMENT (OUTPATIENT)
Facility: HOSPITAL | Age: 44
End: 2024-09-04
Payer: COMMERCIAL

## 2024-09-05 DIAGNOSIS — N64.4 BREAST PAIN IN FEMALE: Primary | ICD-10-CM

## 2024-09-05 NOTE — PROGRESS NOTES
Ordered breast cancer screening mammogram in last visit. Radiology thinks the order should be diagnostic, because the pt allegedly told them she is having pain. I will place radiology-requested orders.

## 2024-09-06 ENCOUNTER — HOSPITAL ENCOUNTER (OUTPATIENT)
Facility: HOSPITAL | Age: 44
Setting detail: RECURRING SERIES
Discharge: HOME | End: 2024-09-09
Payer: COMMERCIAL

## 2024-09-06 PROCEDURE — 97110 THERAPEUTIC EXERCISES: CPT

## 2024-09-06 PROCEDURE — 97112 NEUROMUSCULAR REEDUCATION: CPT

## 2024-09-06 PROCEDURE — 97530 THERAPEUTIC ACTIVITIES: CPT

## 2024-09-09 ENCOUNTER — HOSPITAL ENCOUNTER (OUTPATIENT)
Facility: HOSPITAL | Age: 44
Setting detail: RECURRING SERIES
Discharge: HOME | End: 2024-09-12
Payer: COMMERCIAL

## 2024-09-09 PROCEDURE — 97530 THERAPEUTIC ACTIVITIES: CPT

## 2024-09-09 PROCEDURE — 97110 THERAPEUTIC EXERCISES: CPT

## 2024-09-09 PROCEDURE — 97112 NEUROMUSCULAR REEDUCATION: CPT

## 2024-09-11 ENCOUNTER — APPOINTMENT (OUTPATIENT)
Facility: HOSPITAL | Age: 44
End: 2024-09-11
Payer: COMMERCIAL

## 2024-09-16 ENCOUNTER — HOSPITAL ENCOUNTER (OUTPATIENT)
Facility: HOSPITAL | Age: 44
Setting detail: RECURRING SERIES
Discharge: HOME | End: 2024-09-19
Payer: COMMERCIAL

## 2024-09-16 PROCEDURE — 97530 THERAPEUTIC ACTIVITIES: CPT

## 2024-09-16 PROCEDURE — 97112 NEUROMUSCULAR REEDUCATION: CPT

## 2024-09-16 PROCEDURE — 97110 THERAPEUTIC EXERCISES: CPT

## 2024-09-18 ENCOUNTER — APPOINTMENT (OUTPATIENT)
Facility: HOSPITAL | Age: 44
End: 2024-09-18
Payer: COMMERCIAL

## 2024-09-23 ENCOUNTER — HOSPITAL ENCOUNTER (OUTPATIENT)
Facility: HOSPITAL | Age: 44
Setting detail: RECURRING SERIES
Discharge: HOME | End: 2024-09-26
Payer: COMMERCIAL

## 2024-09-23 PROCEDURE — 97530 THERAPEUTIC ACTIVITIES: CPT

## 2024-09-23 PROCEDURE — 97112 NEUROMUSCULAR REEDUCATION: CPT

## 2024-09-23 PROCEDURE — 97110 THERAPEUTIC EXERCISES: CPT

## 2024-09-25 ENCOUNTER — HOSPITAL ENCOUNTER (OUTPATIENT)
Facility: HOSPITAL | Age: 44
Setting detail: RECURRING SERIES
Discharge: HOME | End: 2024-09-28
Payer: COMMERCIAL

## 2024-09-25 PROCEDURE — 97112 NEUROMUSCULAR REEDUCATION: CPT

## 2024-09-25 PROCEDURE — 97530 THERAPEUTIC ACTIVITIES: CPT

## 2024-09-25 PROCEDURE — 97110 THERAPEUTIC EXERCISES: CPT

## 2024-09-30 ENCOUNTER — HOSPITAL ENCOUNTER (OUTPATIENT)
Facility: HOSPITAL | Age: 44
Discharge: HOME | End: 2024-10-03
Payer: COMMERCIAL

## 2024-09-30 DIAGNOSIS — Z13.820 SCREENING FOR OSTEOPOROSIS: ICD-10-CM

## 2024-09-30 DIAGNOSIS — M84.474S: ICD-10-CM

## 2024-09-30 PROCEDURE — 77080 DXA BONE DENSITY AXIAL: CPT

## 2024-10-30 ENCOUNTER — TELEPHONE (OUTPATIENT)
Facility: HOSPITAL | Age: 44
End: 2024-10-30

## 2024-10-30 NOTE — TELEPHONE ENCOUNTER
pt had no appts schd, called pt and she would like to schd more so I told her I had to submit for more visits, I submitted more visits 10/30/24 m pending status for now, will check status later

## 2024-10-31 ENCOUNTER — TELEPHONE (OUTPATIENT)
Facility: HOSPITAL | Age: 44
End: 2024-10-31

## 2024-11-19 ENCOUNTER — OFFICE VISIT (OUTPATIENT)
Facility: CLINIC | Age: 44
End: 2024-11-19

## 2024-11-19 VITALS
BODY MASS INDEX: 32.52 KG/M2 | HEIGHT: 65 IN | OXYGEN SATURATION: 96 % | WEIGHT: 195.2 LBS | HEART RATE: 77 BPM | DIASTOLIC BLOOD PRESSURE: 80 MMHG | SYSTOLIC BLOOD PRESSURE: 117 MMHG | TEMPERATURE: 98.2 F | RESPIRATION RATE: 14 BRPM

## 2024-11-19 DIAGNOSIS — J45.40 MODERATE PERSISTENT ASTHMA WITHOUT COMPLICATION: ICD-10-CM

## 2024-11-19 DIAGNOSIS — Z09 HOSPITAL DISCHARGE FOLLOW-UP: Primary | ICD-10-CM

## 2024-11-19 RX ORDER — MONTELUKAST SODIUM 5 MG/1
5 TABLET, CHEWABLE ORAL NIGHTLY
Qty: 30 TABLET | Refills: 3 | Status: SHIPPED | OUTPATIENT
Start: 2024-11-19

## 2024-11-19 RX ORDER — BUDESONIDE AND FORMOTEROL FUMARATE DIHYDRATE 80; 4.5 UG/1; UG/1
2 AEROSOL RESPIRATORY (INHALATION) 2 TIMES DAILY
Qty: 10.2 G | Refills: 3 | Status: SHIPPED | OUTPATIENT
Start: 2024-11-19

## 2024-11-19 RX ORDER — ESTRADIOL 0.1 MG/D
1 PATCH TRANSDERMAL WEEKLY
COMMUNITY

## 2024-11-19 RX ORDER — QUETIAPINE FUMARATE 200 MG/1
1 TABLET, FILM COATED ORAL
COMMUNITY

## 2024-11-19 NOTE — PROGRESS NOTES
Meche Livingston is a 44 y.o. year old female who presents today for   Chief Complaint   Patient presents with    Follow-Up from Hospital        \"Have you been to the ER, urgent care clinic since your last visit?  Hospitalized since your last visit?\"   yes Where: [facility]    When:11/16/2024    Reason: asthma     “Have you seen or consulted any other health care providers outside our system since your last visit?”   NO     Have you had a mammogram?”   NO appointment next week    Date of last Mammogram: 10/25/2022             [facility]  [facility]  Ph: [phone]  Fax: [phone]

## 2024-11-19 NOTE — PROGRESS NOTES
Post-Discharge Transitional Care Management Progress Note      Meche Livingston   YOB: 1980    Date of Office Visit:  11/19/2024  Date of Hospital Admission: 11/16  Date of Hospital Discharge: 11/16    Care management risk score Rising risk (score 2-5) and Complex Care (Scores >=6): No Risk Score On File     Non face to face  following discharge, date last encounter closed (first attempt may have been earlier): *No documented post hospital discharge outreach found in the last 14 days *No documented post hospital discharge outreach found in the last 14 days    Call initiated 2 business days of discharge: *No response recorded in the last 14 days    ASSESSMENT/PLAN:   Hospital discharge follow-up  -     RI DISCHARGE MEDS RECONCILED W/ CURRENT OUTPATIENT MED LIST  Moderate persistent asthma without complication  Assessment & Plan:  - add symbicort and singulair  - pulm referral   Orders:  -     montelukast (SINGULAIR) 5 MG chewable tablet; Take 1 tablet by mouth nightly, Disp-30 tablet, R-3Normal  -     budesonide-formoterol (SYMBICORT) 80-4.5 MCG/ACT AERO; Inhale 2 puffs into the lungs 2 times daily, Disp-10.2 g, R-3Normal  -     [facility]    Medical Decision Making: moderate complexity  Return in 3 months (on 2/19/2025).         Subjective:   HPI:  Follow up of Hospital problems/diagnosis(es): asthma exacerbation    Inpatient course: Discharge summary reviewed- see chart.    Patient comes in for shortness of breath and cough. Lung exam is concerning for asthma exacerbation. Hemodynamically stable, not hypoxic on room air and overall nontoxic-appearing. Received multiple DuoNeb breathing treatments, IV magnesium and Decadron with improvement in symptoms.    CBC and BMP are reassuring. Negative viral swabs. Troponin x 2 normal and EKG does not meet STEMI criteria so ACS not likely. Chest x-ray by my read shows no obvious infiltrates.    Repeat lung exam

## 2024-11-22 ENCOUNTER — HOSPITAL ENCOUNTER (OUTPATIENT)
Facility: HOSPITAL | Age: 44
End: 2024-11-22
Payer: COMMERCIAL

## 2024-11-22 ENCOUNTER — HOSPITAL ENCOUNTER (OUTPATIENT)
Facility: HOSPITAL | Age: 44
Discharge: HOME | End: 2024-11-22
Payer: COMMERCIAL

## 2024-11-22 DIAGNOSIS — N64.4 BREAST PAIN IN FEMALE: ICD-10-CM

## 2024-11-22 PROCEDURE — 76642 ULTRASOUND BREAST LIMITED: CPT

## 2024-11-22 PROCEDURE — G0279 TOMOSYNTHESIS, MAMMO: HCPCS

## 2024-12-23 ENCOUNTER — TELEPHONE (OUTPATIENT)
Facility: CLINIC | Age: 44
End: 2024-12-23

## 2024-12-23 NOTE — TELEPHONE ENCOUNTER
Pharmacy faxed request for PCP to send script for brand per insurance    Budesonide-Formoterol 80-4.5

## 2024-12-26 DIAGNOSIS — J45.20 MILD INTERMITTENT ASTHMA WITHOUT COMPLICATION: ICD-10-CM

## 2024-12-26 NOTE — TELEPHONE ENCOUNTER
Medication(s) requesting:   Requested Prescriptions     Pending Prescriptions Disp Refills    albuterol sulfate HFA (PROVENTIL;VENTOLIN;PROAIR) 108 (90 Base) MCG/ACT inhaler [Pharmacy Med Name: ALBUTEROL HFA (PROAIR) INHALER] 8.5 each 5     Sig: INHALE 2 PUFFS INTO THE LUNGS EVERY 6 HOURS AS NEEDED FOR WHEEZING OR SHORTNESS OF BREATH        Last office visit:  11/19/2024  Next office visit DMA: 2/21/2025

## 2024-12-30 RX ORDER — ALBUTEROL SULFATE 90 UG/1
2 INHALANT RESPIRATORY (INHALATION) EVERY 6 HOURS PRN
Qty: 8.5 EACH | Refills: 5 | Status: SHIPPED | OUTPATIENT
Start: 2024-12-30

## 2025-03-31 ENCOUNTER — TELEMEDICINE (OUTPATIENT)
Facility: CLINIC | Age: 45
End: 2025-03-31
Payer: COMMERCIAL

## 2025-03-31 DIAGNOSIS — Z13.1 SCREENING FOR DIABETES MELLITUS: ICD-10-CM

## 2025-03-31 DIAGNOSIS — J30.2 SEASONAL ALLERGIES: Primary | ICD-10-CM

## 2025-03-31 DIAGNOSIS — E55.9 VITAMIN D DEFICIENCY: ICD-10-CM

## 2025-03-31 DIAGNOSIS — J45.20 MILD INTERMITTENT ASTHMA WITHOUT COMPLICATION: ICD-10-CM

## 2025-03-31 DIAGNOSIS — N62 MACROMASTIA: ICD-10-CM

## 2025-03-31 DIAGNOSIS — Z13.220 SCREENING FOR HYPERCHOLESTEROLEMIA: ICD-10-CM

## 2025-03-31 DIAGNOSIS — K64.8 OTHER HEMORRHOIDS: ICD-10-CM

## 2025-03-31 DIAGNOSIS — R19.7 DIARRHEA, UNSPECIFIED TYPE: ICD-10-CM

## 2025-03-31 PROCEDURE — 99214 OFFICE O/P EST MOD 30 MIN: CPT | Performed by: STUDENT IN AN ORGANIZED HEALTH CARE EDUCATION/TRAINING PROGRAM

## 2025-03-31 PROCEDURE — G2211 COMPLEX E/M VISIT ADD ON: HCPCS | Performed by: STUDENT IN AN ORGANIZED HEALTH CARE EDUCATION/TRAINING PROGRAM

## 2025-03-31 RX ORDER — LEVOCETIRIZINE DIHYDROCHLORIDE 5 MG/1
5 TABLET, FILM COATED ORAL NIGHTLY
Qty: 90 TABLET | Refills: 1 | Status: SHIPPED | OUTPATIENT
Start: 2025-03-31

## 2025-03-31 RX ORDER — HYDROCORTISONE ACETATE 25 MG/1
25 SUPPOSITORY RECTAL EVERY 12 HOURS
Qty: 60 SUPPOSITORY | Refills: 1 | Status: SHIPPED | OUTPATIENT
Start: 2025-03-31

## 2025-03-31 SDOH — ECONOMIC STABILITY: TRANSPORTATION INSECURITY
IN THE PAST 12 MONTHS, HAS LACK OF TRANSPORTATION KEPT YOU FROM MEETINGS, WORK, OR FROM GETTING THINGS NEEDED FOR DAILY LIVING?: NO

## 2025-03-31 SDOH — ECONOMIC STABILITY: INCOME INSECURITY: IN THE LAST 12 MONTHS, WAS THERE A TIME WHEN YOU WERE NOT ABLE TO PAY THE MORTGAGE OR RENT ON TIME?: NO

## 2025-03-31 SDOH — ECONOMIC STABILITY: FOOD INSECURITY: WITHIN THE PAST 12 MONTHS, THE FOOD YOU BOUGHT JUST DIDN'T LAST AND YOU DIDN'T HAVE MONEY TO GET MORE.: NEVER TRUE

## 2025-03-31 SDOH — ECONOMIC STABILITY: TRANSPORTATION INSECURITY
IN THE PAST 12 MONTHS, HAS THE LACK OF TRANSPORTATION KEPT YOU FROM MEDICAL APPOINTMENTS OR FROM GETTING MEDICATIONS?: NO

## 2025-03-31 SDOH — ECONOMIC STABILITY: FOOD INSECURITY: WITHIN THE PAST 12 MONTHS, YOU WORRIED THAT YOUR FOOD WOULD RUN OUT BEFORE YOU GOT MONEY TO BUY MORE.: NEVER TRUE

## 2025-03-31 NOTE — ASSESSMENT & PLAN NOTE
- possible malabsorption s/p cholecystectomy, IBS; doubt infectious D or parasite  - labs and GI ref

## 2025-03-31 NOTE — ASSESSMENT & PLAN NOTE
- ref to [facility]htokin for possible hemorrhoid, anal fissure, neoplasm     Orders:    [facility] - [place], DO Sasha, General Surgery, [place] ([facility])    hydrocortisone (ANUSOL-HC) 25 MG suppository; Place 1 suppository rectally in the morning and 1 suppository in the evening.

## 2025-03-31 NOTE — ASSESSMENT & PLAN NOTE
- trial of xyzal  - if no resolution, ref to all/imm     Orders:    levocetirizine (XYZAL) 5 MG tablet; Take 1 tablet by mouth nightly

## 2025-03-31 NOTE — PROGRESS NOTES
Meche Livingston, was evaluated through a synchronous (real-time) audio-video encounter. The patient (or guardian if applicable) is aware that this is a billable service, which includes applicable co-pays. This Virtual Visit was conducted with patient's (and/or legal guardian's) consent. Patient identification was verified, and a caregiver was present when appropriate.   The patient was located at Home: [address] [place] [address] [place] [address]  Provider was located at Facility (Appt Dept): [address] [facility], [address]  [place] [address]  Confirm you are appropriately licensed, registered, or certified to deliver care in the state where the patient is located as indicated above. If you are not or unsure, please re-schedule the visit: Yes, I confirm.     Meche Livingston (:  1980) is a Established patient, presenting virtually for evaluation of the following:      Below is the assessment and plan developed based on review of pertinent history, physical exam, labs, studies, and medications.     Assessment & Plan  Seasonal allergies  - trial of xyzal  - if no resolution, ref to all/imm     Orders:    levocetirizine (XYZAL) 5 MG tablet; Take 1 tablet by mouth nightly    Other hemorrhoids  - ref to [facility]okin for possible hemorrhoid, anal fissure, neoplasm     Orders:    [facility] - Sasha Howard DO, General Surgery, [place] ([facility])    hydrocortisone (ANUSOL-HC) 25 MG suppository; Place 1 suppository rectally in the morning and 1 suppository in the evening.    Diarrhea, unspecified type  - possible malabsorption s/p cholecystectomy, IBS; doubt infectious D or parasite  - labs and GI ref    Orders:    External Referral To Gastroenterology    Comprehensive Metabolic Panel; Future    CBC; Future    Lipase; Future    Screening for hypercholesterolemia       Orders:    Lipid Panel; Future    Screening for diabetes mellitus       Orders:    Hemoglobin A1C; Future    Mild intermittent asthma without

## 2025-04-06 DIAGNOSIS — J45.20 MILD INTERMITTENT ASTHMA WITHOUT COMPLICATION: ICD-10-CM

## 2025-04-07 NOTE — TELEPHONE ENCOUNTER
Medication(s) requesting:   Requested Prescriptions     Pending Prescriptions Disp Refills    fluticasone (FLONASE) 50 MCG/ACT nasal spray [Pharmacy Med Name: FLUTICASONE PROP 50 MCG SPRAY] 16 mL 5     Sig: SPRAY 2 SPRAYS INTO EACH NOSTRIL EVERY DAY       Last office visit:  3.31.2024  Next office visit DMA: Visit date not found

## 2025-04-08 RX ORDER — FLUTICASONE PROPIONATE 50 MCG
2 SPRAY, SUSPENSION (ML) NASAL DAILY
Qty: 16 ML | Refills: 1 | Status: SHIPPED | OUTPATIENT
Start: 2025-04-08

## 2025-04-09 ENCOUNTER — TELEPHONE (OUTPATIENT)
Facility: CLINIC | Age: 45
End: 2025-04-09

## 2025-06-05 DIAGNOSIS — J45.40 MODERATE PERSISTENT ASTHMA WITHOUT COMPLICATION: ICD-10-CM

## 2025-06-05 DIAGNOSIS — M54.50 CHRONIC BILATERAL LOW BACK PAIN WITHOUT SCIATICA: Primary | ICD-10-CM

## 2025-06-05 DIAGNOSIS — G89.29 CHRONIC BILATERAL LOW BACK PAIN WITHOUT SCIATICA: Primary | ICD-10-CM

## 2025-06-05 NOTE — TELEPHONE ENCOUNTER
Medication(s) requesting:   Requested Prescriptions     Pending Prescriptions Disp Refills    meloxicam (MOBIC) 15 MG tablet [Pharmacy Med Name: MELOXICAM 15 MG TABLET] 30 tablet 11     Sig: TAKE 1 TABLET BY MOUTH EVERY DAY    SYMBICORT 80-4.5 MCG/ACT AERO [Pharmacy Med Name: SYMBICORT 80-4.5 MCG INHALER] 30.6 each 1     Sig: INHALE 2 PUFFS INTO THE LUNGS TWICE A DAY       Last Appointment:  3/31/2025  No future appointments.

## 2025-06-07 RX ORDER — DILTIAZEM HYDROCHLORIDE 60 MG/1
2 TABLET, FILM COATED ORAL 2 TIMES DAILY
Qty: 30.6 EACH | Refills: 1 | Status: SHIPPED | OUTPATIENT
Start: 2025-06-07

## 2025-06-07 RX ORDER — MELOXICAM 15 MG/1
15 TABLET ORAL DAILY
Qty: 30 TABLET | Refills: 1 | Status: SHIPPED | OUTPATIENT
Start: 2025-06-07

## (undated) DEVICE — Device

## (undated) DEVICE — SUTURE VCRL SZ 2-0 L27IN ABSRB UD L26MM SH 1/2 CIR J417H

## (undated) DEVICE — STERILE POLYISOPRENE POWDER-FREE SURGICAL GLOVES: Brand: PROTEXIS

## (undated) DEVICE — PREP SKN CHLRAPRP APL 26ML STR --

## (undated) DEVICE — KIT CLN UP BON SECOURS MARYV

## (undated) DEVICE — INTENDED FOR TISSUE SEPARATION, AND OTHER PROCEDURES THAT REQUIRE A SHARP SURGICAL BLADE TO PUNCTURE OR CUT.: Brand: BARD-PARKER SAFETY BLADES SIZE 10, STERILE

## (undated) DEVICE — TUBING, SUCTION, 3/16" X 12', STRAIGHT: Brand: MEDLINE

## (undated) DEVICE — DRAPE TOWEL: Brand: CONVERTORS

## (undated) DEVICE — SUTURE VCRL SZ 3-0 L27IN ABSRB UD L26MM SH 1/2 CIR J416H

## (undated) DEVICE — GLOVE SURG SZ 8 L11.77IN FNGR THK9.8MIL STRW LTX POLYMER

## (undated) DEVICE — PACK PROCEDURE SURG MAJ W/ BASIN LF

## (undated) DEVICE — SMOKE EVACUATION PENCIL: Brand: VALLEYLAB

## (undated) DEVICE — SHEET, DRAPE, SPLIT, STERILE: Brand: MEDLINE

## (undated) DEVICE — SUTURE MCRYL SZ 4-0 L18IN ABSRB UD L19MM PS-2 3/8 CIR PRIM Y496G

## (undated) DEVICE — SOLUTION IV 1000ML 0.9% SOD CHL

## (undated) DEVICE — REM POLYHESIVE ADULT PATIENT RETURN ELECTRODE: Brand: VALLEYLAB